# Patient Record
Sex: FEMALE
[De-identification: names, ages, dates, MRNs, and addresses within clinical notes are randomized per-mention and may not be internally consistent; named-entity substitution may affect disease eponyms.]

---

## 2017-04-14 ENCOUNTER — HOSPITAL (OUTPATIENT)
Dept: OTHER | Age: 67
End: 2017-04-14
Attending: INTERNAL MEDICINE

## 2017-04-14 LAB
ALT SERPL-CCNC: 21 UNIT/L
ANION GAP SERPL CALC-SCNC: 13 MMOL/L (ref 10–20)
BUN SERPL-MCNC: 12 MG/DL (ref 6–20)
BUN/CREAT SERPL: 18 (ref 7–25)
CALCIUM SERPL-MCNC: 8.7 MG/DL (ref 8.4–10.2)
CHLORIDE: 104 MMOL/L (ref 98–107)
CO2 SERPL-SCNC: 28 MMOL/L (ref 21–32)
CREAT SERPL-MCNC: 0.66 MG/DL (ref 0.51–0.95)
FREE T4: 1.2 NG/DL (ref 0.8–1.5)
GLUCOSE SERPL-MCNC: 123 MG/DL (ref 65–99)
GLYCOHEMOGLOBIN: 6.3 % (ref 4.5–5.6)
LENGTH OF FAST TIME PATIENT: 12 HR
POTASSIUM SERPL-SCNC: 4.6 MMOL/L (ref 3.4–5.1)
SODIUM SERPL-SCNC: 140 MMOL/L (ref 135–145)
TSH SERPL-ACNC: 4.61 MCUNIT/ML (ref 0.35–5)

## 2017-10-12 ENCOUNTER — HOSPITAL (OUTPATIENT)
Dept: OTHER | Age: 67
End: 2017-10-12
Attending: INTERNAL MEDICINE

## 2017-10-12 LAB
ALT SERPL-CCNC: 22 UNIT/L
ANION GAP SERPL CALC-SCNC: 13 MMOL/L (ref 10–20)
BUN SERPL-MCNC: 10 MG/DL (ref 6–20)
BUN/CREAT SERPL: 14 (ref 7–25)
CALCIUM SERPL-MCNC: 9.2 MG/DL (ref 8.4–10.2)
CHLORIDE: 105 MMOL/L (ref 98–107)
CO2 SERPL-SCNC: 26 MMOL/L (ref 21–32)
CREAT SERPL-MCNC: 0.69 MG/DL (ref 0.51–0.95)
GLUCOSE SERPL-MCNC: 117 MG/DL (ref 65–99)
GLYCOHEMOGLOBIN: 5.8 % (ref 4.5–5.6)
LENGTH OF FAST TIME PATIENT: 12 HR
POTASSIUM SERPL-SCNC: 5.3 MMOL/L (ref 3.4–5.1)
SODIUM SERPL-SCNC: 139 MMOL/L (ref 135–145)
VIT B12 SERPL-MCNC: 248 PG/ML (ref 211–911)

## 2018-02-24 ENCOUNTER — TELEPHONE (OUTPATIENT)
Dept: INTERNAL MEDICINE CLINIC | Facility: CLINIC | Age: 68
End: 2018-02-24

## 2018-06-14 ENCOUNTER — HOSPITAL (OUTPATIENT)
Dept: OTHER | Age: 68
End: 2018-06-14
Attending: INTERNAL MEDICINE

## 2018-06-14 LAB
ALT SERPL-CCNC: 24 UNIT/L
ANION GAP SERPL CALC-SCNC: 15 MMOL/L (ref 10–20)
BUN SERPL-MCNC: 9 MG/DL (ref 6–20)
BUN/CREAT SERPL: 13 (ref 7–25)
CALCIUM SERPL-MCNC: 9.1 MG/DL (ref 8.4–10.2)
CHLORIDE: 103 MMOL/L (ref 98–107)
CO2 SERPL-SCNC: 24 MMOL/L (ref 21–32)
CREAT SERPL-MCNC: 0.68 MG/DL (ref 0.51–0.95)
GLUCOSE SERPL-MCNC: 112 MG/DL (ref 65–99)
GLYCOHEMOGLOBIN: 5.8 % (ref 4.5–5.6)
LENGTH OF FAST TIME PATIENT: 12 HR
POTASSIUM SERPL-SCNC: 4.5 MMOL/L (ref 3.4–5.1)
SODIUM SERPL-SCNC: 137 MMOL/L (ref 135–145)

## 2018-12-01 ENCOUNTER — HOSPITAL (OUTPATIENT)
Dept: OTHER | Age: 68
End: 2018-12-01
Attending: INTERNAL MEDICINE

## 2018-12-01 LAB
ALT SERPL-CCNC: 24 UNIT/L
ALT SERPL-CCNC: 24 UNITS/L
ANION GAP SERPL CALC-SCNC: 14 MMOL/L (ref 10–20)
ANION GAP SERPL CALC-SCNC: 14 MMOL/L (ref 10–20)
BUN SERPL-MCNC: 13 MG/DL (ref 6–20)
BUN SERPL-MCNC: 13 MG/DL (ref 6–20)
BUN/CREAT SERPL: 20 (ref 7–25)
BUN/CREAT SERPL: 20 (ref 7–25)
CALCIUM SERPL-MCNC: 8.8 MG/DL (ref 8.4–10.2)
CALCIUM SERPL-MCNC: 8.8 MG/DL (ref 8.4–10.2)
CHLORIDE SERPL-SCNC: 105 MMOL/L (ref 98–107)
CHLORIDE: 105 MMOL/L (ref 98–107)
CHOLEST SERPL-MCNC: 151 MG/DL
CHOLEST SERPL-MCNC: 151 MG/DL
CHOLEST SERPL-MCNC: NORMAL MG/DL
CHOLEST/HDLC SERPL: 2.5 {RATIO}
CHOLEST/HDLC SERPL: 2.5 {RATIO}
CO2 SERPL-SCNC: 25 MMOL/L (ref 21–32)
CO2 SERPL-SCNC: 25 MMOL/L (ref 21–32)
CREAT SERPL-MCNC: 0.66 MG/DL (ref 0.51–0.95)
CREAT SERPL-MCNC: 0.66 MG/DL (ref 0.51–0.95)
FREE T4: 1.3 NG/DL (ref 0.8–1.5)
GLUCOSE SERPL-MCNC: 120 MG/DL (ref 65–99)
GLUCOSE SERPL-MCNC: 120 MG/DL (ref 65–99)
GLYCOHEMOGLOBIN: 6.3 % (ref 4.5–5.6)
HDLC SERPL-MCNC: 61 MG/DL
HDLC SERPL-MCNC: 61 MG/DL
HDLC SERPL-MCNC: NORMAL MG/DL
LDLC SERPL CALC-MCNC: 73 MG/DL
LDLC SERPL CALC-MCNC: 73 MG/DL
LDLC SERPL CALC-MCNC: NORMAL MG/DL
LENGTH OF FAST TIME PATIENT: 14 HR
LENGTH OF FAST TIME PATIENT: 14 HR
LENGTH OF FAST TIME PATIENT: 14 HRS
LENGTH OF FAST TIME PATIENT: 14 HRS
NONHDLC SERPL-MCNC: 90 MG/DL
NONHDLC SERPL-MCNC: 90 MG/DL
NONHDLC SERPL-MCNC: NORMAL MG/DL
POTASSIUM SERPL-SCNC: 4.3 MMOL/L (ref 3.4–5.1)
POTASSIUM SERPL-SCNC: 4.3 MMOL/L (ref 3.4–5.1)
SODIUM SERPL-SCNC: 140 MMOL/L (ref 135–145)
SODIUM SERPL-SCNC: 140 MMOL/L (ref 135–145)
T4 FREE SERPL-MCNC: 1.3 NG/DL (ref 0.8–1.5)
TRIGL SERPL-MCNC: 83 MG/DL
TRIGL SERPL-MCNC: NORMAL MG/DL
TRIGLYCERIDE (TRIGP): 83 MG/DL
TSH SERPL-ACNC: 2.7 MCUNIT/ML (ref 0.35–5)
TSH SERPL-ACNC: 2.7 MCUNITS/ML (ref 0.35–5)

## 2018-12-02 LAB
HBA1C MFR BLD: 10.0           24 %
HBA1C MFR BLD: 6.0            126 %
HBA1C MFR BLD: 6.3 % (ref 4.5–5.6)
HBA1C MFR BLD: 6.5            14 %
HBA1C MFR BLD: 7.0            154 %
HBA1C MFR BLD: 7.5            169 %
HBA1C MFR BLD: 8.0            183 %
HBA1C MFR BLD: 8.5            197 %
HBA1C MFR BLD: 9.0            212 %
HBA1C MFR BLD: 9.5            226 %
HBA1C MFR BLD: ABNORMAL %

## 2018-12-03 ENCOUNTER — HOSPITAL ENCOUNTER (OUTPATIENT)
Dept: ULTRASOUND IMAGING | Facility: HOSPITAL | Age: 68
Discharge: HOME | End: 2018-12-03
Attending: INTERNAL MEDICINE
Payer: MEDICARE

## 2018-12-03 DIAGNOSIS — E04.1 NON-TOXIC UNINODULAR GOITER: ICD-10-CM

## 2018-12-03 PROCEDURE — 76536 US EXAM OF HEAD AND NECK: CPT | Performed by: INTERNAL MEDICINE

## 2019-03-18 ENCOUNTER — TELEPHONE (OUTPATIENT)
Dept: INTERNAL MEDICINE CLINIC | Facility: CLINIC | Age: 69
End: 2019-03-18

## 2019-03-18 NOTE — TELEPHONE ENCOUNTER
Patient calling for pre surgical lab orders to be entered if possible this week to complete for pre op appt set up next week 4/25/19 with doctor.   She is having cataract surgery with Dr Estiven Avery on 4/17/19 please call her back so she knows when to get labs d

## 2019-03-25 ENCOUNTER — OFFICE VISIT (OUTPATIENT)
Dept: INTERNAL MEDICINE CLINIC | Facility: CLINIC | Age: 69
End: 2019-03-25
Payer: MEDICARE

## 2019-03-25 ENCOUNTER — TELEPHONE (OUTPATIENT)
Dept: INTERNAL MEDICINE CLINIC | Facility: CLINIC | Age: 69
End: 2019-03-25

## 2019-03-25 VITALS
RESPIRATION RATE: 12 BRPM | WEIGHT: 183.5 LBS | SYSTOLIC BLOOD PRESSURE: 112 MMHG | HEIGHT: 64.5 IN | BODY MASS INDEX: 30.95 KG/M2 | TEMPERATURE: 98 F | OXYGEN SATURATION: 99 % | HEART RATE: 67 BPM | DIASTOLIC BLOOD PRESSURE: 76 MMHG

## 2019-03-25 DIAGNOSIS — E11.9 TYPE 2 DIABETES MELLITUS WITHOUT COMPLICATION, WITH LONG-TERM CURRENT USE OF INSULIN (HCC): ICD-10-CM

## 2019-03-25 DIAGNOSIS — Z12.31 ENCOUNTER FOR SCREENING MAMMOGRAM FOR MALIGNANT NEOPLASM OF BREAST: ICD-10-CM

## 2019-03-25 DIAGNOSIS — E78.00 PURE HYPERCHOLESTEROLEMIA: ICD-10-CM

## 2019-03-25 DIAGNOSIS — Z01.818 PRE-OPERATIVE EXAMINATION FOR INTERNAL MEDICINE: Primary | ICD-10-CM

## 2019-03-25 DIAGNOSIS — Z79.4 TYPE 2 DIABETES MELLITUS WITHOUT COMPLICATION, WITH LONG-TERM CURRENT USE OF INSULIN (HCC): ICD-10-CM

## 2019-03-25 DIAGNOSIS — Z78.0 ASYMPTOMATIC MENOPAUSAL STATE: ICD-10-CM

## 2019-03-25 DIAGNOSIS — Z00.00 LABORATORY EXAMINATION ORDERED AS PART OF A ROUTINE GENERAL MEDICAL EXAMINATION: ICD-10-CM

## 2019-03-25 PROCEDURE — 93000 ELECTROCARDIOGRAM COMPLETE: CPT | Performed by: INTERNAL MEDICINE

## 2019-03-25 PROCEDURE — 99204 OFFICE O/P NEW MOD 45 MIN: CPT | Performed by: INTERNAL MEDICINE

## 2019-03-25 NOTE — TELEPHONE ENCOUNTER
Dr Krishna Salcedo called to let us know they faxed 15 pg notes regarding this patient/surgery cataracts etc. Samra Paz

## 2019-03-25 NOTE — PROGRESS NOTES
Elijah Hutchinson  9/10/1950 is a 76year old female.     Patient presents with:  Pre-Op Exam: Cataract surgery right eye with Dr Margy Bustillo @ \[facility]\"" on 2019      HPI:    see below     Current Outpatient Medications:  Netarsudil patient is legally blind secondary to glaucoma does go to [facility] ophthalmology no redness, no drainage. Ears no earaches, no fullness, normal hearing, no tinnitus. Nose and Sinuses no colds, no discharge, no hay fever, no sinus trouble.  Mouth and Phary Ht 64.5\"   Wt 183 lb 8 oz   SpO2 99%   BMI 31.01 kg/m²   GENERAL:   Build: normal .   General Appearance: alert and oriented, pleasant. HEENT:   Ear canals: normal.   EOM: within normal limit-conjunctiva normal.   Head: normocephalic.    Nasal septum: m PLATELET  -     URINALYSIS, ROUTINE  -     [facility] SCREENING BILAT (CPT=77067); Future  -     XR DEXA BONE DENSITOMETRY (CPT=77080); Future    Encounter for screening mammogram for malignant neoplasm of breast   -     [facility] SCREENING BILAT (CPT=77067);  Future

## 2019-03-25 NOTE — PROGRESS NOTES
HPI:    Patient ID: Chey Steel is a 76year old female. HPI    Review of Systems         Current Outpatient Medications:  Netarsudil Dimesylate (RHOPRESSA) 0.02 % Ophthalmic Solution Place 1 drop into the left eye nightly.  Disp:  Rfl:    micah Referrals:  ELECTROCARDIOGRAM, COMPLETE  [facility] SCREENING BILAT (CPT=77067)  XR DEXA BONE DENSITOMETRY (CPT=77080)       TL#[id number]

## 2019-03-25 NOTE — PATIENT INSTRUCTIONS
Patient to see me after all tests are done  Await lab work prior to clearance.   We will send colonoscopy and I records

## 2019-03-26 ENCOUNTER — TELEPHONE (OUTPATIENT)
Dept: INTERNAL MEDICINE CLINIC | Facility: CLINIC | Age: 69
End: 2019-03-26

## 2019-03-26 NOTE — TELEPHONE ENCOUNTER
Exam reports received from [facility] - reports placed in provider in box for review.

## 2019-03-29 ENCOUNTER — APPOINTMENT (OUTPATIENT)
Dept: LAB | Age: 69
End: 2019-03-29
Attending: INTERNAL MEDICINE
Payer: MEDICARE

## 2019-03-29 LAB
ALBUMIN SERPL-MCNC: 3.4 G/DL (ref 3.4–5)
ALBUMIN/GLOB SERPL: 1 {RATIO} (ref 1–2)
ALP LIVER SERPL-CCNC: 72 U/L (ref 55–142)
ALT SERPL-CCNC: 20 U/L (ref 13–56)
ANION GAP SERPL CALC-SCNC: 7 MMOL/L (ref 0–18)
AST SERPL-CCNC: 19 U/L (ref 15–37)
BASOPHILS # BLD AUTO: 0.05 X10(3) UL (ref 0–0.2)
BASOPHILS NFR BLD AUTO: 0.9 %
BILIRUB SERPL-MCNC: 0.5 MG/DL (ref 0.1–2)
BILIRUB UR QL STRIP.AUTO: NEGATIVE
BUN BLD-MCNC: 12 MG/DL (ref 7–18)
BUN/CREAT SERPL: 15.8 (ref 10–20)
CALCIUM BLD-MCNC: 9.1 MG/DL (ref 8.5–10.1)
CHLORIDE SERPL-SCNC: 107 MMOL/L (ref 98–107)
CHOLEST SMN-MCNC: 172 MG/DL (ref ?–200)
CLARITY UR REFRACT.AUTO: CLEAR
CO2 SERPL-SCNC: 25 MMOL/L (ref 21–32)
COLOR UR AUTO: YELLOW
CREAT BLD-MCNC: 0.76 MG/DL (ref 0.55–1.02)
CREAT UR-SCNC: 66.3 MG/DL
DEPRECATED RDW RBC AUTO: 48.9 FL (ref 35.1–46.3)
EOSINOPHIL # BLD AUTO: 0.09 X10(3) UL (ref 0–0.7)
EOSINOPHIL NFR BLD AUTO: 1.7 %
ERYTHROCYTE [DISTWIDTH] IN BLOOD BY AUTOMATED COUNT: 15.2 % (ref 11–15)
EST. AVERAGE GLUCOSE BLD GHB EST-MCNC: 128 MG/DL (ref 68–126)
GLOBULIN PLAS-MCNC: 3.3 G/DL (ref 2.8–4.4)
GLUCOSE BLD-MCNC: 111 MG/DL (ref 70–99)
GLUCOSE UR STRIP.AUTO-MCNC: NEGATIVE MG/DL
HBA1C MFR BLD HPLC: 6.1 % (ref ?–5.7)
HCT VFR BLD AUTO: 41.6 % (ref 35–48)
HDLC SERPL-MCNC: 65 MG/DL (ref 40–59)
HGB BLD-MCNC: 13.4 G/DL (ref 12–16)
IMM GRANULOCYTES # BLD AUTO: 0.01 X10(3) UL (ref 0–1)
IMM GRANULOCYTES NFR BLD: 0.2 %
KETONES UR STRIP.AUTO-MCNC: NEGATIVE MG/DL
LDLC SERPL CALC-MCNC: 88 MG/DL (ref ?–100)
LEUKOCYTE ESTERASE UR QL STRIP.AUTO: NEGATIVE
LYMPHOCYTES # BLD AUTO: 1.6 X10(3) UL (ref 1–4)
LYMPHOCYTES NFR BLD AUTO: 30.4 %
M PROTEIN MFR SERPL ELPH: 6.7 G/DL (ref 6.4–8.2)
MCH RBC QN AUTO: 28.3 PG (ref 26–34)
MCHC RBC AUTO-ENTMCNC: 32.2 G/DL (ref 31–37)
MCV RBC AUTO: 87.8 FL (ref 80–100)
MICROALBUMIN UR-MCNC: 0.51 MG/DL
MICROALBUMIN/CREAT 24H UR-RTO: 7.7 UG/MG (ref ?–30)
MONOCYTES # BLD AUTO: 0.38 X10(3) UL (ref 0.1–1)
MONOCYTES NFR BLD AUTO: 7.2 %
NEUTROPHILS # BLD AUTO: 3.14 X10 (3) UL (ref 1.5–7.7)
NEUTROPHILS # BLD AUTO: 3.14 X10(3) UL (ref 1.5–7.7)
NEUTROPHILS NFR BLD AUTO: 59.6 %
NITRITE UR QL STRIP.AUTO: NEGATIVE
NONHDLC SERPL-MCNC: 107 MG/DL (ref ?–130)
OSMOLALITY SERPL CALC.SUM OF ELEC: 288 MOSM/KG (ref 275–295)
PH UR STRIP.AUTO: 8 [PH] (ref 4.5–8)
PLATELET # BLD AUTO: 229 10(3)UL (ref 150–450)
POTASSIUM SERPL-SCNC: 5.1 MMOL/L (ref 3.5–5.1)
PROT UR STRIP.AUTO-MCNC: NEGATIVE MG/DL
RBC # BLD AUTO: 4.74 X10(6)UL (ref 3.8–5.3)
SODIUM SERPL-SCNC: 139 MMOL/L (ref 136–145)
SP GR UR STRIP.AUTO: 1.01 (ref 1–1.03)
T4 SERPL-MCNC: 11.6 UG/DL (ref 4.8–13.9)
TRIGL SERPL-MCNC: 94 MG/DL (ref 30–149)
TSI SER-ACNC: 3.99 MIU/ML (ref 0.36–3.74)
UROBILINOGEN UR STRIP.AUTO-MCNC: <2 MG/DL
VLDLC SERPL CALC-MCNC: 19 MG/DL (ref 0–30)
WBC # BLD AUTO: 5.3 X10(3) UL (ref 4–11)

## 2019-04-01 ENCOUNTER — TELEPHONE (OUTPATIENT)
Dept: INTERNAL MEDICINE CLINIC | Facility: CLINIC | Age: 69
End: 2019-04-01

## 2019-04-08 ENCOUNTER — OFFICE VISIT (OUTPATIENT)
Dept: INTERNAL MEDICINE CLINIC | Facility: CLINIC | Age: 69
End: 2019-04-08
Payer: MEDICARE

## 2019-04-08 VITALS
DIASTOLIC BLOOD PRESSURE: 72 MMHG | BODY MASS INDEX: 30.99 KG/M2 | HEART RATE: 78 BPM | TEMPERATURE: 98 F | HEIGHT: 64.5 IN | OXYGEN SATURATION: 98 % | SYSTOLIC BLOOD PRESSURE: 122 MMHG | WEIGHT: 183.75 LBS | RESPIRATION RATE: 12 BRPM

## 2019-04-08 DIAGNOSIS — Z79.4 TYPE 2 DIABETES MELLITUS WITHOUT COMPLICATION, WITH LONG-TERM CURRENT USE OF INSULIN (HCC): ICD-10-CM

## 2019-04-08 DIAGNOSIS — E11.9 TYPE 2 DIABETES MELLITUS WITHOUT COMPLICATION, WITH LONG-TERM CURRENT USE OF INSULIN (HCC): ICD-10-CM

## 2019-04-08 DIAGNOSIS — Z00.00 ROUTINE GENERAL MEDICAL EXAMINATION AT A HEALTH CARE FACILITY: Primary | ICD-10-CM

## 2019-04-08 PROCEDURE — G0439 PPPS, SUBSEQ VISIT: HCPCS | Performed by: INTERNAL MEDICINE

## 2019-04-08 NOTE — PROGRESS NOTES
Martínez Rosas  9/10/1950 is a 76year old female. Patient presents with:  Physical      HPI:   cpx     Current Outpatient Medications:  Netarsudil Dimesylate (RHOPRESSA) 0.02 % Ophthalmic Solution Place 1 drop into the left eye nightly.  Disp:  Rfl drainage. Ears no earaches, no fullness, normal hearing, no tinnitus. Nose and Sinuses no colds, no discharge, no hay fever, no sinus trouble. Mouth and Pharynx no sore throats, no hoarseness. Neck no lumps, no goiter, no neck stiffness or pain.    Endocrin normal .   General Appearance: alert and oriented, pleasant. HEENT:   Ear canals: normal.   EOM: within normal limit-conjunctiva normal.   Head: normocephalic. Nasal septum: midline. Nose: unremarkable.    Oral cavity: normal.   Pupils: normal, bilate patient  Patient Instructions   Continue present management       The patient indicates understanding of these issues and agrees to the plan. The patient is asked to Return in about 6 months (around 10/8/2019).   Becky Jones MD

## 2019-04-11 ENCOUNTER — HOSPITAL ENCOUNTER (OUTPATIENT)
Dept: MAMMOGRAPHY | Age: 69
Discharge: HOME | End: 2019-04-11
Attending: INTERNAL MEDICINE
Payer: MEDICARE

## 2019-04-11 ENCOUNTER — HOSPITAL ENCOUNTER (OUTPATIENT)
Dept: BONE DENSITY | Age: 69
Discharge: HOME | End: 2019-04-11
Attending: INTERNAL MEDICINE
Payer: MEDICARE

## 2019-04-11 ENCOUNTER — TELEPHONE (OUTPATIENT)
Dept: INTERNAL MEDICINE CLINIC | Facility: CLINIC | Age: 69
End: 2019-04-11

## 2019-04-11 DIAGNOSIS — Z12.31 ENCOUNTER FOR SCREENING MAMMOGRAM FOR MALIGNANT NEOPLASM OF BREAST: ICD-10-CM

## 2019-04-11 DIAGNOSIS — Z00.00 LABORATORY EXAMINATION ORDERED AS PART OF A ROUTINE GENERAL MEDICAL EXAMINATION: ICD-10-CM

## 2019-04-11 DIAGNOSIS — Z78.0 ASYMPTOMATIC MENOPAUSAL STATE: ICD-10-CM

## 2019-04-11 PROCEDURE — 77067 SCR MAMMO BI INCL CAD: CPT | Performed by: INTERNAL MEDICINE

## 2019-04-11 PROCEDURE — 77063 BREAST TOMOSYNTHESIS BI: CPT | Performed by: INTERNAL MEDICINE

## 2019-04-11 PROCEDURE — 77080 DXA BONE DENSITY AXIAL: CPT | Performed by: INTERNAL MEDICINE

## 2019-04-11 RX ORDER — ALENDRONATE SODIUM 70 MG/1
70 TABLET ORAL WEEKLY
Qty: 12 TABLET | Refills: 0 | Status: SHIPPED | OUTPATIENT
Start: 2019-04-11 | End: 2020-01-07

## 2019-04-11 NOTE — TELEPHONE ENCOUNTER
Notes recorded by Rajesh Monaco MD on 4/11/2019 at 9:28 AM CDT  Reviewed results   Bones are definitely in the osteopenic range they are getting worse compared to before  Would recommend vitamin D 4000 units daily calcium 1500 mg daily and Fosamax once w

## 2019-04-17 ENCOUNTER — PATIENT MESSAGE (OUTPATIENT)
Dept: INTERNAL MEDICINE CLINIC | Facility: CLINIC | Age: 69
End: 2019-04-17

## 2019-04-17 DIAGNOSIS — Z13.21 ENCOUNTER FOR VITAMIN DEFICIENCY SCREENING: Primary | ICD-10-CM

## 2019-04-17 NOTE — TELEPHONE ENCOUNTER
It does not look like a vitamin D has been checked. Please advise if willing to order vitamin D or if would like to follow up with Dr. Natasha Philip when he is back in the office.  TY

## 2019-04-18 NOTE — TELEPHONE ENCOUNTER
Dr. Galileo Munguia- please review pt's mychart message regarding vitamin D level. Vitamin D level pending if you would like pt to have it checked.  TY    From: Za Johnson  Sent: 4/17/2019  3:40 PM CDT  To: Hattie Benavides MD  Subject: [facility]

## 2019-04-18 NOTE — PROGRESS NOTES
Save for Dr. Micah Burden. Johan Zepeda. Milton Mayes MD  Diplomate, American Board of Internal Medicine  [address] [facility]  [address] [facility], [address], [facility], [address]  T: [phone]; F: [address]

## 2019-04-22 RX ORDER — CHOLECALCIFEROL (VITAMIN D3) 50 MCG
1 TABLET ORAL DAILY
Qty: 90 TABLET | Refills: 3 | COMMUNITY
Start: 2019-04-22

## 2019-05-24 ENCOUNTER — LAB ENCOUNTER (OUTPATIENT)
Dept: LAB | Age: 69
End: 2019-05-24
Attending: INTERNAL MEDICINE
Payer: MEDICARE

## 2019-05-24 DIAGNOSIS — Z13.21 ENCOUNTER FOR VITAMIN DEFICIENCY SCREENING: ICD-10-CM

## 2019-05-24 PROCEDURE — 36415 COLL VENOUS BLD VENIPUNCTURE: CPT

## 2019-05-24 PROCEDURE — 82306 VITAMIN D 25 HYDROXY: CPT

## 2020-01-07 ENCOUNTER — OFFICE VISIT (OUTPATIENT)
Dept: INTERNAL MEDICINE CLINIC | Facility: CLINIC | Age: 70
End: 2020-01-07
Payer: MEDICARE

## 2020-01-07 VITALS
DIASTOLIC BLOOD PRESSURE: 84 MMHG | SYSTOLIC BLOOD PRESSURE: 140 MMHG | WEIGHT: 188.25 LBS | RESPIRATION RATE: 16 BRPM | OXYGEN SATURATION: 98 % | HEART RATE: 83 BPM | HEIGHT: 64.5 IN | TEMPERATURE: 99 F | BODY MASS INDEX: 31.75 KG/M2

## 2020-01-07 DIAGNOSIS — Z79.4 TYPE 2 DIABETES MELLITUS WITHOUT COMPLICATION, WITH LONG-TERM CURRENT USE OF INSULIN (HCC): ICD-10-CM

## 2020-01-07 DIAGNOSIS — E78.00 PURE HYPERCHOLESTEROLEMIA: ICD-10-CM

## 2020-01-07 DIAGNOSIS — E11.9 TYPE 2 DIABETES MELLITUS WITHOUT COMPLICATION, WITH LONG-TERM CURRENT USE OF INSULIN (HCC): ICD-10-CM

## 2020-01-07 DIAGNOSIS — Z01.818 PREOPERATIVE EXAMINATION, UNSPECIFIED: Primary | ICD-10-CM

## 2020-01-07 PROCEDURE — 99214 OFFICE O/P EST MOD 30 MIN: CPT | Performed by: INTERNAL MEDICINE

## 2020-01-07 RX ORDER — ALENDRONATE SODIUM 70 MG/1
70 TABLET ORAL WEEKLY
Qty: 12 TABLET | Refills: 0 | Status: SHIPPED | OUTPATIENT
Start: 2020-01-07 | End: 2020-06-15

## 2020-01-07 NOTE — PROGRESS NOTES
James Louie  9/10/1950 is a 71year old female.     Patient presents with:  Pre-Op Exam: Pre-op clearance for Cataract Surgery left eye- Dr. Luz Lawrence sched -      HPI:   He  has had previous anesthesia:  yes  Previous complications:  no  Current Ou health, no fatigue, no fever, no weakness, no weight loss or gain . HEENT/Neck:   Head no headache, no dizziness, no lightheadedness. Eyes does see Dr. Eris Chavez for surgery in the left eye. Ears no complaints. Nose and Sinuses no complaints.  Mout Chest Shape: normal .   Percussion: normal.   Rales: no .   Respiratory effort: normal .   Rhonchi: no.   Wheezes: no. ABDOMEN:   General: normal.   Liver, Spleen: non-enlarged. EXTREMITIES:   Clubbing: none. Cyanosis: absent . Edema: none.    Pul

## 2020-01-09 LAB
ALBUMIN/GLOBULIN RATIO: 1.8 (CALC) (ref 1–2.5)
ALBUMIN: 4.1 G/DL (ref 3.6–5.1)
ALKALINE PHOSPHATASE: 73 U/L (ref 33–130)
ALT: 16 U/L (ref 6–29)
AST: 20 U/L (ref 10–35)
BILIRUBIN, TOTAL: 0.6 MG/DL (ref 0.2–1.2)
BUN: 11 MG/DL (ref 7–25)
CALCIUM: 9.4 MG/DL (ref 8.6–10.4)
CARBON DIOXIDE: 29 MMOL/L (ref 20–32)
CHLORIDE: 102 MMOL/L (ref 98–110)
CHOL/HDLC RATIO: 3 (CALC)
CHOLESTEROL, TOTAL: 170 MG/DL
CREATININE: 0.78 MG/DL (ref 0.5–0.99)
EGFR IF AFRICN AM: 90 ML/MIN/1.73M2
EGFR IF NONAFRICN AM: 78 ML/MIN/1.73M2
GLOBULIN: 2.3 G/DL (CALC) (ref 1.9–3.7)
GLUCOSE: 130 MG/DL (ref 65–99)
HDL CHOLESTEROL: 56 MG/DL
HEMOGLOBIN A1C: 6.1 % OF TOTAL HGB
LDL-CHOLESTEROL: 90 MG/DL (CALC)
NON-HDL CHOLESTEROL: 114 MG/DL (CALC)
POTASSIUM: 5.6 MMOL/L (ref 3.5–5.3)
PROTEIN, TOTAL: 6.4 G/DL (ref 6.1–8.1)
SODIUM: 137 MMOL/L (ref 135–146)
TRIGLYCERIDES: 137 MG/DL

## 2020-01-13 ENCOUNTER — TELEPHONE (OUTPATIENT)
Dept: INTERNAL MEDICINE CLINIC | Facility: CLINIC | Age: 70
End: 2020-01-13

## 2020-01-13 DIAGNOSIS — E87.5 SERUM POTASSIUM ELEVATED: Primary | ICD-10-CM

## 2020-01-13 NOTE — TELEPHONE ENCOUNTER
Notes recorded by Amy Diallo MD on 1/9/2020 at 3:01 PM CST  Reviewed results hemoglobin A1c is excellent lipid panel is also unremarkable however potassium is slightly high at 5.6 could be a lab aberration however would recommend holding the lisinopri

## 2020-01-14 NOTE — TELEPHONE ENCOUNTER
Patient calling in asking to speak with the Nurse regarding the conversation from yesterday. Pt stated she has a few more questions.      T: [phone]

## 2020-01-14 NOTE — TELEPHONE ENCOUNTER
Pt calling to verify that clearance will be able to be sent on Monday if she has lab completed on Thursday/Friday. Advised pt that provider most likely wouldn't get lab until Monday unless done on Thursday. Pt stated she would have lab completed Thursday.

## 2020-01-16 LAB
BUN: 9 MG/DL (ref 7–25)
CALCIUM: 9.3 MG/DL (ref 8.6–10.4)
CARBON DIOXIDE: 26 MMOL/L (ref 20–32)
CHLORIDE: 100 MMOL/L (ref 98–110)
CREATININE: 0.84 MG/DL (ref 0.5–0.99)
EGFR IF AFRICN AM: 82 ML/MIN/1.73M2
EGFR IF NONAFRICN AM: 71 ML/MIN/1.73M2
GLUCOSE: 116 MG/DL (ref 65–99)
POTASSIUM: 5.1 MMOL/L (ref 3.5–5.3)
SODIUM: 135 MMOL/L (ref 135–146)

## 2020-01-17 ENCOUNTER — TELEPHONE (OUTPATIENT)
Dept: INTERNAL MEDICINE CLINIC | Facility: CLINIC | Age: 70
End: 2020-01-17

## 2020-06-15 RX ORDER — ALENDRONATE SODIUM 70 MG/1
TABLET ORAL
Qty: 12 TABLET | Refills: 0 | Status: SHIPPED | OUTPATIENT
Start: 2020-06-15 | End: 2020-09-10

## 2020-06-15 NOTE — TELEPHONE ENCOUNTER
Passed protocol - DEXA: 4/11/2019    Requesting alendronate 70 MG Oral Tab  LOV: 1/7/20  RTC: 6 months  Last Relevant Labs: 1/16/20  Filled: 1/7/20 #12 with 0 refills    No future appointments.

## 2020-06-16 RX ORDER — ALENDRONATE SODIUM 70 MG/1
TABLET ORAL
Qty: 13 TABLET | Refills: 0 | OUTPATIENT
Start: 2020-06-16

## 2020-08-07 ENCOUNTER — TELEPHONE (OUTPATIENT)
Dept: INTERNAL MEDICINE CLINIC | Facility: CLINIC | Age: 70
End: 2020-08-07

## 2020-08-07 DIAGNOSIS — E78.00 PURE HYPERCHOLESTEROLEMIA: ICD-10-CM

## 2020-08-07 DIAGNOSIS — E11.9 TYPE 2 DIABETES MELLITUS WITHOUT COMPLICATION, WITH LONG-TERM CURRENT USE OF INSULIN (HCC): ICD-10-CM

## 2020-08-07 DIAGNOSIS — Z13.29 SCREENING FOR THYROID DISORDER: ICD-10-CM

## 2020-08-07 DIAGNOSIS — Z00.00 LABORATORY EXAMINATION ORDERED AS PART OF A ROUTINE GENERAL MEDICAL EXAMINATION: Primary | ICD-10-CM

## 2020-08-07 DIAGNOSIS — Z79.4 TYPE 2 DIABETES MELLITUS WITHOUT COMPLICATION, WITH LONG-TERM CURRENT USE OF INSULIN (HCC): ICD-10-CM

## 2020-08-07 NOTE — TELEPHONE ENCOUNTER
Patient scheduled an appointment for a follow up on 08/21/2020, however, pt is requesting for the (repeat) labs to be entered prior to her visit. Please call pt with order status.

## 2020-08-10 NOTE — TELEPHONE ENCOUNTER
Spoke with patient and advised her that her lab orders have been placed. Rescheduled patient's appointment to 09/10 at 12:00pm for her Map Supervisit. Patient verbalized understanding.

## 2020-08-10 NOTE — TELEPHONE ENCOUNTER
Front staff: Pt due for Horace. Is she willing to reschedule to a cpx time slot? Dr. Marty Carrillo: Annual lab orders pending as pt is due. Please advise.  TY

## 2020-09-08 ENCOUNTER — TELEPHONE (OUTPATIENT)
Dept: INTERNAL MEDICINE CLINIC | Facility: CLINIC | Age: 70
End: 2020-09-08

## 2020-09-08 DIAGNOSIS — Z00.00 LABORATORY EXAMINATION ORDERED AS PART OF A ROUTINE GENERAL MEDICAL EXAMINATION: Primary | ICD-10-CM

## 2020-09-08 DIAGNOSIS — Z79.4 TYPE 2 DIABETES MELLITUS WITHOUT COMPLICATION, WITH LONG-TERM CURRENT USE OF INSULIN (HCC): ICD-10-CM

## 2020-09-08 DIAGNOSIS — E11.9 TYPE 2 DIABETES MELLITUS WITHOUT COMPLICATION, WITH LONG-TERM CURRENT USE OF INSULIN (HCC): ICD-10-CM

## 2020-09-08 NOTE — TELEPHONE ENCOUNTER
Pt stated no urine tests were completed but all blood tests were completed. Orders placed through [facility] for urine testing. Pt aware to schedule lab appt.

## 2020-09-08 NOTE — TELEPHONE ENCOUNTER
Patient calling for change in lab location to [facility] for Urinalysis order her insurance did not cover at [facility]; she also would like to know if she needs to fast with this test; advised nurse can confirm when calling her that order is placed.

## 2020-09-09 ENCOUNTER — LAB ENCOUNTER (OUTPATIENT)
Dept: LAB | Age: 70
End: 2020-09-09
Attending: INTERNAL MEDICINE
Payer: MEDICARE

## 2020-09-09 DIAGNOSIS — E11.9 TYPE 2 DIABETES MELLITUS WITHOUT COMPLICATION, WITH LONG-TERM CURRENT USE OF INSULIN (HCC): ICD-10-CM

## 2020-09-09 DIAGNOSIS — Z00.00 LABORATORY EXAMINATION ORDERED AS PART OF A ROUTINE GENERAL MEDICAL EXAMINATION: ICD-10-CM

## 2020-09-09 DIAGNOSIS — Z79.4 TYPE 2 DIABETES MELLITUS WITHOUT COMPLICATION, WITH LONG-TERM CURRENT USE OF INSULIN (HCC): ICD-10-CM

## 2020-09-09 LAB
ABSOLUTE BASOPHILS: 47 CELLS/UL (ref 0–200)
ABSOLUTE EOSINOPHILS: 161 CELLS/UL (ref 15–500)
ABSOLUTE LYMPHOCYTES: 2271 CELLS/UL (ref 850–3900)
ABSOLUTE MONOCYTES: 630 CELLS/UL (ref 200–950)
ABSOLUTE NEUTROPHILS: 3591 CELLS/UL (ref 1500–7800)
ALBUMIN/GLOBULIN RATIO: 1.6 (CALC) (ref 1–2.5)
ALBUMIN: 4.1 G/DL (ref 3.6–5.1)
ALKALINE PHOSPHATASE: 63 U/L (ref 37–153)
ALT: 15 U/L (ref 6–29)
AST: 21 U/L (ref 10–35)
BASOPHILS: 0.7 %
BILIRUB UR QL STRIP.AUTO: NEGATIVE
BILIRUBIN, TOTAL: 0.5 MG/DL (ref 0.2–1.2)
BUN: 9 MG/DL (ref 7–25)
CALCIUM: 9.5 MG/DL (ref 8.6–10.4)
CARBON DIOXIDE: 27 MMOL/L (ref 20–32)
CHLORIDE: 103 MMOL/L (ref 98–110)
CHOL/HDLC RATIO: 2.9 (CALC)
CHOLESTEROL, TOTAL: 161 MG/DL
CLARITY UR REFRACT.AUTO: CLEAR
COLOR UR AUTO: YELLOW
CREAT UR-SCNC: 76.6 MG/DL
CREATININE: 0.82 MG/DL (ref 0.5–0.99)
EGFR IF AFRICN AM: 85 ML/MIN/1.73M2
EGFR IF NONAFRICN AM: 73 ML/MIN/1.73M2
EOSINOPHILS: 2.4 %
GLOBULIN: 2.5 G/DL (CALC) (ref 1.9–3.7)
GLUCOSE UR STRIP.AUTO-MCNC: NEGATIVE MG/DL
GLUCOSE: 123 MG/DL (ref 65–99)
HDL CHOLESTEROL: 55 MG/DL
HEMATOCRIT: 41.4 % (ref 35–45)
HEMOGLOBIN A1C: 5.7 % OF TOTAL HGB
HEMOGLOBIN: 13.5 G/DL (ref 11.7–15.5)
KETONES UR STRIP.AUTO-MCNC: NEGATIVE MG/DL
LDL-CHOLESTEROL: 81 MG/DL (CALC)
LEUKOCYTE ESTERASE UR QL STRIP.AUTO: NEGATIVE
LYMPHOCYTES: 33.9 %
MCH: 27.3 PG (ref 27–33)
MCHC: 32.6 G/DL (ref 32–36)
MCV: 83.8 FL (ref 80–100)
MICROALBUMIN UR-MCNC: 0.83 MG/DL
MICROALBUMIN/CREAT 24H UR-RTO: 10.8 UG/MG (ref ?–30)
MONOCYTES: 9.4 %
MPV: 11 FL (ref 7.5–12.5)
NEUTROPHILS: 53.6 %
NITRITE UR QL STRIP.AUTO: NEGATIVE
NON-HDL CHOLESTEROL: 106 MG/DL (CALC)
PH UR STRIP.AUTO: 8 [PH] (ref 4.5–8)
PLATELET COUNT: 300 THOUSAND/UL (ref 140–400)
POTASSIUM: 4.6 MMOL/L (ref 3.5–5.3)
PROT UR STRIP.AUTO-MCNC: NEGATIVE MG/DL
PROTEIN, TOTAL: 6.6 G/DL (ref 6.1–8.1)
RDW: 13.4 % (ref 11–15)
RED BLOOD CELL COUNT: 4.94 MILLION/UL (ref 3.8–5.1)
SODIUM: 136 MMOL/L (ref 135–146)
SP GR UR STRIP.AUTO: 1.01 (ref 1–1.03)
T4 (THYROXINE), TOTAL: 14.5 MCG/DL (ref 5.1–11.9)
TRIGLYCERIDES: 145 MG/DL
TSH: 4.14 MIU/L (ref 0.4–4.5)
UROBILINOGEN UR STRIP.AUTO-MCNC: <2 MG/DL
WHITE BLOOD CELL COUNT: 6.7 THOUSAND/UL (ref 3.8–10.8)

## 2020-09-09 PROCEDURE — 82570 ASSAY OF URINE CREATININE: CPT

## 2020-09-09 PROCEDURE — 82043 UR ALBUMIN QUANTITATIVE: CPT

## 2020-09-09 PROCEDURE — 81001 URINALYSIS AUTO W/SCOPE: CPT

## 2020-09-10 ENCOUNTER — OFFICE VISIT (OUTPATIENT)
Dept: INTERNAL MEDICINE CLINIC | Facility: CLINIC | Age: 70
End: 2020-09-10
Payer: MEDICARE

## 2020-09-10 VITALS
DIASTOLIC BLOOD PRESSURE: 76 MMHG | WEIGHT: 176.81 LBS | SYSTOLIC BLOOD PRESSURE: 124 MMHG | BODY MASS INDEX: 29.82 KG/M2 | TEMPERATURE: 99 F | RESPIRATION RATE: 16 BRPM | HEART RATE: 84 BPM | HEIGHT: 64.5 IN

## 2020-09-10 DIAGNOSIS — E11.9 TYPE 2 DIABETES MELLITUS WITHOUT COMPLICATION, WITH LONG-TERM CURRENT USE OF INSULIN (HCC): ICD-10-CM

## 2020-09-10 DIAGNOSIS — M85.88 OSTEOPENIA OF LUMBAR SPINE: ICD-10-CM

## 2020-09-10 DIAGNOSIS — Z00.00 ROUTINE GENERAL MEDICAL EXAMINATION AT A HEALTH CARE FACILITY: Primary | ICD-10-CM

## 2020-09-10 DIAGNOSIS — Z79.4 TYPE 2 DIABETES MELLITUS WITHOUT COMPLICATION, WITH LONG-TERM CURRENT USE OF INSULIN (HCC): ICD-10-CM

## 2020-09-10 DIAGNOSIS — Z12.31 ENCOUNTER FOR SCREENING MAMMOGRAM FOR MALIGNANT NEOPLASM OF BREAST: ICD-10-CM

## 2020-09-10 DIAGNOSIS — Z12.11 SCREENING FOR COLON CANCER: ICD-10-CM

## 2020-09-10 DIAGNOSIS — E78.00 PURE HYPERCHOLESTEROLEMIA: ICD-10-CM

## 2020-09-10 PROCEDURE — G0009 ADMIN PNEUMOCOCCAL VACCINE: HCPCS | Performed by: INTERNAL MEDICINE

## 2020-09-10 PROCEDURE — 93000 ELECTROCARDIOGRAM COMPLETE: CPT | Performed by: INTERNAL MEDICINE

## 2020-09-10 PROCEDURE — G0439 PPPS, SUBSEQ VISIT: HCPCS | Performed by: INTERNAL MEDICINE

## 2020-09-10 PROCEDURE — 90670 PCV13 VACCINE IM: CPT | Performed by: INTERNAL MEDICINE

## 2020-09-10 RX ORDER — ALENDRONATE SODIUM 70 MG/1
70 TABLET ORAL WEEKLY
Qty: 12 TABLET | Refills: 0 | Status: SHIPPED | OUTPATIENT
Start: 2020-09-10 | End: 2020-10-26

## 2020-09-10 NOTE — PROGRESS NOTES
Za Johnson  9/10/1950 is a 79year old female.     Patient presents with:  Wellness Visit      HPI:   cpx   Current Outpatient Medications   Medication Sig Dispense Refill   • alendronate 70 MG Oral Tab Take 1 tablet (70 mg total) by mouth once a w HEENT/Neck:   Head no headache, no dizziness, no lightheadedness. Eyes patient is legally blind secondary to glaucoma does go to -[facility] ophthalmology no redness, no drainage. Ears no earaches, no fullness, normal hearing, no tinnitus.  Nose and Sinus Psychiatric: none            EXAM:   /76 (BP Location: Left arm, Patient Position: Sitting, Cuff Size: adult)   Pulse 84   Temp 98.5 °F (36.9 °C) (Oral)   Resp 16   Ht 64.5\"   Wt 176 lb 12.8 oz (80.2 kg)   BMI 29.88 kg/m²   GENERAL:   Build: lisa for wellness visit. Diagnoses and all orders for this visit:    Routine general medical examination at a health care facility  -     ELECTROCARDIOGRAM, COMPLETE  -     [facility] SCREENING BILAT (CPT=77067);  Future  -     Cancel: GASTRO - INTERNAL    Pure hype

## 2020-09-23 ENCOUNTER — TELEPHONE (OUTPATIENT)
Dept: INTERNAL MEDICINE CLINIC | Facility: CLINIC | Age: 70
End: 2020-09-23

## 2020-09-23 NOTE — TELEPHONE ENCOUNTER
Pt stated she was instructed to see a cardiologist at last [address] on 9/10/2020. Pt stated that her dtr is a cardiologist and she discussed w/ pt dtr. Pt is wanting provider to call pt dtr to discuss.     Pt dtr contact info below:    Reva Mancillafilippo - [phone]

## 2020-09-23 NOTE — TELEPHONE ENCOUNTER
Pt would like MA to call nancy back regarding her cardiologist referral did try to get more information but pt did not want to give anymore information

## 2020-10-25 DIAGNOSIS — M85.88 OSTEOPENIA OF LUMBAR SPINE: ICD-10-CM

## 2020-10-26 RX ORDER — ALENDRONATE SODIUM 70 MG/1
TABLET ORAL
Qty: 12 TABLET | Refills: 3 | Status: SHIPPED | OUTPATIENT
Start: 2020-10-26 | End: 2021-10-28

## 2020-10-26 NOTE — TELEPHONE ENCOUNTER
Passed protocol    Requesting alendronate 70 MG Oral Tab  LOV: 9/10/20  RTC: 6 months  Last Relevant Labs: 9/9/20  Filled: 9/10/20 #12 with 0 refills    No future appointments.

## 2021-01-26 ENCOUNTER — IMMUNIZATION (OUTPATIENT)
Dept: LAB | Facility: HOSPITAL | Age: 71
End: 2021-01-26
Attending: EMERGENCY MEDICINE
Payer: MEDICARE

## 2021-01-26 DIAGNOSIS — Z23 NEED FOR VACCINATION: Primary | ICD-10-CM

## 2021-01-26 PROCEDURE — 0011A SARSCOV2 VAC 100MCG/0.5ML IM: CPT

## 2021-02-23 ENCOUNTER — IMMUNIZATION (OUTPATIENT)
Dept: LAB | Facility: HOSPITAL | Age: 71
End: 2021-02-23
Attending: EMERGENCY MEDICINE
Payer: MEDICARE

## 2021-02-23 DIAGNOSIS — Z23 NEED FOR VACCINATION: Primary | ICD-10-CM

## 2021-02-23 PROCEDURE — 0012A SARSCOV2 VAC 100MCG/0.5ML IM: CPT

## 2021-04-05 ENCOUNTER — TELEPHONE (OUTPATIENT)
Dept: INTERNAL MEDICINE CLINIC | Facility: CLINIC | Age: 71
End: 2021-04-05

## 2021-04-05 DIAGNOSIS — E11.9 TYPE 2 DIABETES MELLITUS WITHOUT COMPLICATION, WITH LONG-TERM CURRENT USE OF INSULIN (HCC): Primary | ICD-10-CM

## 2021-04-05 DIAGNOSIS — Z79.4 TYPE 2 DIABETES MELLITUS WITHOUT COMPLICATION, WITH LONG-TERM CURRENT USE OF INSULIN (HCC): Primary | ICD-10-CM

## 2021-04-05 NOTE — TELEPHONE ENCOUNTER
Patient called stating she received reminder message to complete A1C and to please place order. Order pending if appropriate.

## 2021-04-12 ENCOUNTER — LAB ENCOUNTER (OUTPATIENT)
Dept: LAB | Age: 71
End: 2021-04-12
Attending: INTERNAL MEDICINE
Payer: MEDICARE

## 2021-04-12 DIAGNOSIS — E11.9 TYPE 2 DIABETES MELLITUS WITHOUT COMPLICATION, WITH LONG-TERM CURRENT USE OF INSULIN (HCC): ICD-10-CM

## 2021-04-12 DIAGNOSIS — Z79.4 TYPE 2 DIABETES MELLITUS WITHOUT COMPLICATION, WITH LONG-TERM CURRENT USE OF INSULIN (HCC): ICD-10-CM

## 2021-04-12 PROCEDURE — 36415 COLL VENOUS BLD VENIPUNCTURE: CPT

## 2021-04-12 PROCEDURE — 83036 HEMOGLOBIN GLYCOSYLATED A1C: CPT

## 2021-07-27 ENCOUNTER — HOSPITAL ENCOUNTER (OUTPATIENT)
Dept: MAMMOGRAPHY | Age: 71
Discharge: HOME | End: 2021-07-27
Attending: INTERNAL MEDICINE
Payer: MEDICARE

## 2021-07-27 DIAGNOSIS — Z00.00 ROUTINE GENERAL MEDICAL EXAMINATION AT A HEALTH CARE FACILITY: ICD-10-CM

## 2021-07-27 DIAGNOSIS — Z12.31 ENCOUNTER FOR SCREENING MAMMOGRAM FOR MALIGNANT NEOPLASM OF BREAST: ICD-10-CM

## 2021-07-27 PROCEDURE — 77063 BREAST TOMOSYNTHESIS BI: CPT | Performed by: INTERNAL MEDICINE

## 2021-07-27 PROCEDURE — 77067 SCR MAMMO BI INCL CAD: CPT | Performed by: INTERNAL MEDICINE

## 2021-07-29 DIAGNOSIS — E11.9 TYPE 2 DIABETES MELLITUS WITHOUT COMPLICATION, WITH LONG-TERM CURRENT USE OF INSULIN (HCC): ICD-10-CM

## 2021-07-29 DIAGNOSIS — Z79.4 TYPE 2 DIABETES MELLITUS WITHOUT COMPLICATION, WITH LONG-TERM CURRENT USE OF INSULIN (HCC): ICD-10-CM

## 2021-07-29 NOTE — TELEPHONE ENCOUNTER
Medication(s) to Refill:   Requested Prescriptions     Pending Prescriptions Disp Refills   • METFORMIN HCL 1000 MG Oral Tab [Pharmacy Med Name: MetFORMIN 1000MG TABLET] 180 tablet 3     Sig: TAKE 1 TABLET BY MOUTH  TWICE DAILY WITH MEALS       LOV: 9-10-2

## 2021-10-27 DIAGNOSIS — M85.88 OSTEOPENIA OF LUMBAR SPINE: ICD-10-CM

## 2021-10-27 NOTE — TELEPHONE ENCOUNTER
Medication(s) to Refill:   Requested Prescriptions     Pending Prescriptions Disp Refills   • ALENDRONATE 70 MG Oral Tab [Pharmacy Med Name: Alendronate Sodium 70 MG Oral Tablet] 12 tablet 3     Sig: TAKE 1 TABLET BY MOUTH  WEEKLY WITH 8 OZ OF PLAIN  WATER

## 2021-10-28 RX ORDER — ALENDRONATE SODIUM 70 MG/1
TABLET ORAL
Qty: 12 TABLET | Refills: 3 | Status: SHIPPED | OUTPATIENT
Start: 2021-10-28

## 2021-11-23 ENCOUNTER — OFFICE VISIT (OUTPATIENT)
Dept: INTERNAL MEDICINE CLINIC | Facility: CLINIC | Age: 71
End: 2021-11-23
Payer: MEDICARE

## 2021-11-23 VITALS
HEART RATE: 76 BPM | TEMPERATURE: 98 F | RESPIRATION RATE: 16 BRPM | OXYGEN SATURATION: 99 % | SYSTOLIC BLOOD PRESSURE: 126 MMHG | BODY MASS INDEX: 25.74 KG/M2 | HEIGHT: 64.5 IN | DIASTOLIC BLOOD PRESSURE: 84 MMHG | WEIGHT: 152.63 LBS

## 2021-11-23 DIAGNOSIS — Z79.4 TYPE 2 DIABETES MELLITUS WITHOUT COMPLICATION, WITH LONG-TERM CURRENT USE OF INSULIN (HCC): ICD-10-CM

## 2021-11-23 DIAGNOSIS — E11.9 TYPE 2 DIABETES MELLITUS WITHOUT COMPLICATION, WITH LONG-TERM CURRENT USE OF INSULIN (HCC): ICD-10-CM

## 2021-11-23 DIAGNOSIS — Z79.899 OTHER LONG TERM (CURRENT) DRUG THERAPY: ICD-10-CM

## 2021-11-23 DIAGNOSIS — E78.00 PURE HYPERCHOLESTEROLEMIA: ICD-10-CM

## 2021-11-23 DIAGNOSIS — M85.88 OSTEOPENIA OF LUMBAR SPINE: ICD-10-CM

## 2021-11-23 DIAGNOSIS — Z00.00 ROUTINE GENERAL MEDICAL EXAMINATION AT A HEALTH CARE FACILITY: Primary | ICD-10-CM

## 2021-11-23 PROCEDURE — 93000 ELECTROCARDIOGRAM COMPLETE: CPT | Performed by: INTERNAL MEDICINE

## 2021-11-23 PROCEDURE — G0439 PPPS, SUBSEQ VISIT: HCPCS | Performed by: INTERNAL MEDICINE

## 2021-11-23 NOTE — PROGRESS NOTES
Luis Judd  9/10/1950 is a 70year old female.     Patient presents with:  Wellness Visit: AWV      HPI:   cpx   Current Outpatient Medications   Medication Sig Dispense Refill   • ALENDRONATE 70 MG Oral Tab TAKE 1 TABLET BY MOUTH  WEEKLY WITH 8 OZ health, no fatigue, no fever, no weakness, no weight loss or gain . HEENT/Neck:   Head no headache, no dizziness, no lightheadedness. Eyes patient is legally blind secondary to glaucoma does go to -[facility] ophthalmology no redness, no drainage.  Ears n Tingling/numbness none. Trouble with balance none.    Psychiatric: none            EXAM:   /84   Pulse 76   Temp 97.9 °F (36.6 °C) (Oral)   Resp 16   Ht 5' 4.5\" (1.638 m)   Wt 152 lb 9.6 oz (69.2 kg)   SpO2 99%   BMI 25.79 kg/m²   GENERAL:   Build: n today for wellness visit. Diagnoses and all orders for this visit:    Routine general medical examination at a health care facility  -     CBC WITH DIFFERENTIAL WITH PLATELET; Future  -     COMP METABOLIC PANEL (14);  Future  -     T4(THYROXINE TOTAL); F

## 2022-03-02 ENCOUNTER — HOSPITAL ENCOUNTER (OUTPATIENT)
Dept: BONE DENSITY | Age: 72
Discharge: HOME | End: 2022-03-02
Attending: INTERNAL MEDICINE
Payer: MEDICARE

## 2022-03-02 ENCOUNTER — LAB ENCOUNTER (OUTPATIENT)
Dept: LAB | Age: 72
End: 2022-03-02
Attending: INTERNAL MEDICINE
Payer: MEDICARE

## 2022-03-02 DIAGNOSIS — Z79.899 OTHER LONG TERM (CURRENT) DRUG THERAPY: ICD-10-CM

## 2022-03-02 DIAGNOSIS — E78.00 PURE HYPERCHOLESTEROLEMIA: ICD-10-CM

## 2022-03-02 DIAGNOSIS — Z00.00 ROUTINE GENERAL MEDICAL EXAMINATION AT A HEALTH CARE FACILITY: ICD-10-CM

## 2022-03-02 DIAGNOSIS — M85.88 OSTEOPENIA OF LUMBAR SPINE: ICD-10-CM

## 2022-03-02 DIAGNOSIS — Z79.4 TYPE 2 DIABETES MELLITUS WITHOUT COMPLICATION, WITH LONG-TERM CURRENT USE OF INSULIN (HCC): ICD-10-CM

## 2022-03-02 DIAGNOSIS — E11.9 TYPE 2 DIABETES MELLITUS WITHOUT COMPLICATION, WITH LONG-TERM CURRENT USE OF INSULIN (HCC): ICD-10-CM

## 2022-03-02 LAB
ALBUMIN SERPL-MCNC: 3.3 G/DL (ref 3.4–5)
ALBUMIN/GLOB SERPL: 1 {RATIO} (ref 1–2)
ALP LIVER SERPL-CCNC: 92 U/L
ALT SERPL-CCNC: 18 U/L
ANION GAP SERPL CALC-SCNC: 5 MMOL/L (ref 0–18)
AST SERPL-CCNC: 20 U/L (ref 15–37)
BASOPHILS # BLD AUTO: 0.04 X10(3) UL (ref 0–0.2)
BASOPHILS NFR BLD AUTO: 0.7 %
BILIRUB SERPL-MCNC: 0.6 MG/DL (ref 0.1–2)
BILIRUB UR QL STRIP.AUTO: NEGATIVE
BUN BLD-MCNC: 10 MG/DL (ref 7–18)
CALCIUM BLD-MCNC: 9 MG/DL (ref 8.5–10.1)
CHLORIDE SERPL-SCNC: 102 MMOL/L (ref 98–112)
CHOLEST SERPL-MCNC: 162 MG/DL (ref ?–200)
CO2 SERPL-SCNC: 28 MMOL/L (ref 21–32)
COLOR UR AUTO: YELLOW
CREAT BLD-MCNC: 0.75 MG/DL
CREAT UR-SCNC: 49.9 MG/DL
EOSINOPHIL # BLD AUTO: 0.1 X10(3) UL (ref 0–0.7)
EOSINOPHIL NFR BLD AUTO: 1.8 %
ERYTHROCYTE [DISTWIDTH] IN BLOOD BY AUTOMATED COUNT: 13.9 %
EST. AVERAGE GLUCOSE BLD GHB EST-MCNC: 128 MG/DL (ref 68–126)
FASTING PATIENT LIPID ANSWER: YES
FASTING STATUS PATIENT QL REPORTED: YES
GLOBULIN PLAS-MCNC: 3.2 G/DL (ref 2.8–4.4)
GLUCOSE BLD-MCNC: 133 MG/DL (ref 70–99)
GLUCOSE UR STRIP.AUTO-MCNC: NEGATIVE MG/DL
HBA1C MFR BLD: 6.1 % (ref ?–5.7)
HCT VFR BLD AUTO: 41.3 %
HDLC SERPL-MCNC: 69 MG/DL (ref 40–59)
HGB BLD-MCNC: 13.2 G/DL
HYALINE CASTS #/AREA URNS AUTO: PRESENT /LPF
IMM GRANULOCYTES # BLD AUTO: 0.02 X10(3) UL (ref 0–1)
IMM GRANULOCYTES NFR BLD: 0.4 %
KETONES UR STRIP.AUTO-MCNC: NEGATIVE MG/DL
LDLC SERPL CALC-MCNC: 76 MG/DL (ref ?–100)
LEUKOCYTE ESTERASE UR QL STRIP.AUTO: NEGATIVE
LYMPHOCYTES # BLD AUTO: 1.74 X10(3) UL (ref 1–4)
LYMPHOCYTES NFR BLD AUTO: 31 %
MCH RBC QN AUTO: 28.2 PG (ref 26–34)
MCHC RBC AUTO-ENTMCNC: 32 G/DL (ref 31–37)
MCV RBC AUTO: 88.2 FL
MICROALBUMIN UR-MCNC: 3.07 MG/DL
MICROALBUMIN/CREAT 24H UR-RTO: 61.5 UG/MG (ref ?–30)
MONOCYTES # BLD AUTO: 0.52 X10(3) UL (ref 0.1–1)
MONOCYTES NFR BLD AUTO: 9.3 %
NEUTROPHILS # BLD AUTO: 3.2 X10 (3) UL (ref 1.5–7.7)
NEUTROPHILS # BLD AUTO: 3.2 X10(3) UL (ref 1.5–7.7)
NEUTROPHILS NFR BLD AUTO: 56.8 %
NITRITE UR QL STRIP.AUTO: NEGATIVE
NONHDLC SERPL-MCNC: 93 MG/DL (ref ?–130)
OSMOLALITY SERPL CALC.SUM OF ELEC: 281 MOSM/KG (ref 275–295)
PH UR STRIP.AUTO: 8 [PH] (ref 5–8)
PLATELET # BLD AUTO: 306 10(3)UL (ref 150–450)
POTASSIUM SERPL-SCNC: 4.5 MMOL/L (ref 3.5–5.1)
PROT SERPL-MCNC: 6.5 G/DL (ref 6.4–8.2)
PROT UR STRIP.AUTO-MCNC: NEGATIVE MG/DL
RBC # BLD AUTO: 4.68 X10(6)UL
SODIUM SERPL-SCNC: 135 MMOL/L (ref 136–145)
SP GR UR STRIP.AUTO: 1.01 (ref 1–1.03)
T4 SERPL-MCNC: 13.4 UG/DL
TSI SER-ACNC: 4.69 MIU/ML (ref 0.36–3.74)
UROBILINOGEN UR STRIP.AUTO-MCNC: <2 MG/DL
VIT D+METAB SERPL-MCNC: 56.4 NG/ML (ref 30–100)
VLDLC SERPL CALC-MCNC: 14 MG/DL (ref 0–30)
WBC # BLD AUTO: 5.6 X10(3) UL (ref 4–11)

## 2022-03-02 PROCEDURE — 82043 UR ALBUMIN QUANTITATIVE: CPT

## 2022-03-02 PROCEDURE — 84443 ASSAY THYROID STIM HORMONE: CPT

## 2022-03-02 PROCEDURE — 80053 COMPREHEN METABOLIC PANEL: CPT

## 2022-03-02 PROCEDURE — 81001 URINALYSIS AUTO W/SCOPE: CPT

## 2022-03-02 PROCEDURE — 82570 ASSAY OF URINE CREATININE: CPT

## 2022-03-02 PROCEDURE — 83036 HEMOGLOBIN GLYCOSYLATED A1C: CPT

## 2022-03-02 PROCEDURE — 80061 LIPID PANEL: CPT

## 2022-03-02 PROCEDURE — 84436 ASSAY OF TOTAL THYROXINE: CPT

## 2022-03-02 PROCEDURE — 82306 VITAMIN D 25 HYDROXY: CPT

## 2022-03-02 PROCEDURE — 77080 DXA BONE DENSITY AXIAL: CPT | Performed by: INTERNAL MEDICINE

## 2022-03-02 PROCEDURE — 85025 COMPLETE CBC W/AUTO DIFF WBC: CPT

## 2022-03-09 ENCOUNTER — TELEPHONE (OUTPATIENT)
Dept: INTERNAL MEDICINE CLINIC | Facility: CLINIC | Age: 72
End: 2022-03-09

## 2022-07-14 ENCOUNTER — TELEPHONE (OUTPATIENT)
Dept: INTERNAL MEDICINE CLINIC | Facility: CLINIC | Age: 72
End: 2022-07-14

## 2022-07-14 DIAGNOSIS — Z12.31 ENCOUNTER FOR SCREENING MAMMOGRAM FOR MALIGNANT NEOPLASM OF BREAST: Primary | ICD-10-CM

## 2022-07-26 ENCOUNTER — TELEPHONE (OUTPATIENT)
Dept: INTERNAL MEDICINE CLINIC | Facility: CLINIC | Age: 72
End: 2022-07-26

## 2022-07-26 DIAGNOSIS — E11.9 TYPE 2 DIABETES MELLITUS WITHOUT COMPLICATION, WITH LONG-TERM CURRENT USE OF INSULIN (HCC): ICD-10-CM

## 2022-07-26 DIAGNOSIS — Z79.4 TYPE 2 DIABETES MELLITUS WITHOUT COMPLICATION, WITH LONG-TERM CURRENT USE OF INSULIN (HCC): ICD-10-CM

## 2022-07-26 DIAGNOSIS — E78.00 PURE HYPERCHOLESTEROLEMIA: Primary | ICD-10-CM

## 2022-07-26 NOTE — TELEPHONE ENCOUNTER
Pt scheduled a 6 month follow up, pt would like to do labs prior to appt, labs last completed in March. Please place orders if appropriate.     Future Appointments   Date Time Provider Kandis Deyanira   8/15/2022 11:30 AM Ar Reyez MD [facility] [place]

## 2022-07-29 ENCOUNTER — HOSPITAL ENCOUNTER (OUTPATIENT)
Dept: MAMMOGRAPHY | Age: 72
Discharge: HOME | End: 2022-07-29
Attending: INTERNAL MEDICINE
Payer: MEDICARE

## 2022-07-29 DIAGNOSIS — Z12.31 ENCOUNTER FOR SCREENING MAMMOGRAM FOR MALIGNANT NEOPLASM OF BREAST: ICD-10-CM

## 2022-07-29 PROCEDURE — 77063 BREAST TOMOSYNTHESIS BI: CPT | Performed by: INTERNAL MEDICINE

## 2022-07-29 PROCEDURE — 77067 SCR MAMMO BI INCL CAD: CPT | Performed by: INTERNAL MEDICINE

## 2022-08-15 ENCOUNTER — OFFICE VISIT (OUTPATIENT)
Dept: INTERNAL MEDICINE CLINIC | Facility: CLINIC | Age: 72
End: 2022-08-15
Payer: MEDICARE

## 2022-08-15 VITALS
DIASTOLIC BLOOD PRESSURE: 64 MMHG | WEIGHT: 142.63 LBS | SYSTOLIC BLOOD PRESSURE: 110 MMHG | RESPIRATION RATE: 12 BRPM | BODY MASS INDEX: 24.06 KG/M2 | TEMPERATURE: 98 F | HEIGHT: 64.5 IN | HEART RATE: 85 BPM | OXYGEN SATURATION: 99 %

## 2022-08-15 DIAGNOSIS — Z79.4 TYPE 2 DIABETES MELLITUS WITHOUT COMPLICATION, WITH LONG-TERM CURRENT USE OF INSULIN (HCC): Primary | ICD-10-CM

## 2022-08-15 DIAGNOSIS — E11.9 TYPE 2 DIABETES MELLITUS WITHOUT COMPLICATION, WITH LONG-TERM CURRENT USE OF INSULIN (HCC): Primary | ICD-10-CM

## 2022-08-15 DIAGNOSIS — E78.00 PURE HYPERCHOLESTEROLEMIA: ICD-10-CM

## 2022-08-15 DIAGNOSIS — R63.4 WEIGHT LOSS: ICD-10-CM

## 2022-08-15 PROBLEM — M85.88 OSTEOPENIA OF LUMBAR SPINE: Chronic | Status: ACTIVE | Noted: 2021-11-23

## 2022-08-15 PROCEDURE — 99213 OFFICE O/P EST LOW 20 MIN: CPT | Performed by: INTERNAL MEDICINE

## 2022-08-15 PROCEDURE — 90732 PPSV23 VACC 2 YRS+ SUBQ/IM: CPT | Performed by: INTERNAL MEDICINE

## 2022-08-15 PROCEDURE — G0009 ADMIN PNEUMOCOCCAL VACCINE: HCPCS | Performed by: INTERNAL MEDICINE

## 2022-08-15 RX ORDER — MULTIVIT-MIN/IRON FUM/FOLIC AC 7.5 MG-4
1 TABLET ORAL DAILY
COMMUNITY

## 2022-08-16 ENCOUNTER — TELEPHONE (OUTPATIENT)
Dept: INTERNAL MEDICINE CLINIC | Facility: CLINIC | Age: 72
End: 2022-08-16

## 2022-08-26 DIAGNOSIS — M85.88 OSTEOPENIA OF LUMBAR SPINE: ICD-10-CM

## 2022-08-27 RX ORDER — ALENDRONATE SODIUM 70 MG/1
TABLET ORAL
Qty: 12 TABLET | Refills: 3 | Status: SHIPPED | OUTPATIENT
Start: 2022-08-27

## 2022-08-27 NOTE — TELEPHONE ENCOUNTER
Alendronate 70 mg  Filled 10-28-21  Qty 12  3 refills  No upcoming appt.   LOV 8-15-22   Labs: 3-2-22: CMP  Imaging 3/2/22 Dexa

## 2022-08-29 ENCOUNTER — LAB ENCOUNTER (OUTPATIENT)
Dept: LAB | Age: 72
End: 2022-08-29
Attending: INTERNAL MEDICINE
Payer: MEDICARE

## 2022-08-29 DIAGNOSIS — Z79.4 TYPE 2 DIABETES MELLITUS WITHOUT COMPLICATION, WITH LONG-TERM CURRENT USE OF INSULIN (HCC): ICD-10-CM

## 2022-08-29 DIAGNOSIS — R63.4 WEIGHT LOSS: ICD-10-CM

## 2022-08-29 DIAGNOSIS — E78.00 PURE HYPERCHOLESTEROLEMIA: ICD-10-CM

## 2022-08-29 DIAGNOSIS — E11.9 TYPE 2 DIABETES MELLITUS WITHOUT COMPLICATION, WITH LONG-TERM CURRENT USE OF INSULIN (HCC): ICD-10-CM

## 2022-08-29 LAB
ALBUMIN SERPL-MCNC: 3.2 G/DL (ref 3.4–5)
ALBUMIN/GLOB SERPL: 1.1 {RATIO} (ref 1–2)
ALP LIVER SERPL-CCNC: 92 U/L
ALT SERPL-CCNC: 16 U/L
ANION GAP SERPL CALC-SCNC: 10 MMOL/L (ref 0–18)
AST SERPL-CCNC: 17 U/L (ref 15–37)
BASOPHILS # BLD AUTO: 0.04 X10(3) UL (ref 0–0.2)
BASOPHILS NFR BLD AUTO: 0.7 %
BILIRUB SERPL-MCNC: 0.5 MG/DL (ref 0.1–2)
BUN BLD-MCNC: 8 MG/DL (ref 7–18)
BUN/CREAT SERPL: 11 (ref 10–20)
CALCIUM BLD-MCNC: 9.2 MG/DL (ref 8.5–10.1)
CHLORIDE SERPL-SCNC: 103 MMOL/L (ref 98–112)
CHOLEST SERPL-MCNC: 150 MG/DL (ref ?–200)
CO2 SERPL-SCNC: 24 MMOL/L (ref 21–32)
CREAT BLD-MCNC: 0.73 MG/DL
DEPRECATED RDW RBC AUTO: 45.3 FL (ref 35.1–46.3)
EOSINOPHIL # BLD AUTO: 0.14 X10(3) UL (ref 0–0.7)
EOSINOPHIL NFR BLD AUTO: 2.5 %
ERYTHROCYTE [DISTWIDTH] IN BLOOD BY AUTOMATED COUNT: 14 % (ref 11–15)
EST. AVERAGE GLUCOSE BLD GHB EST-MCNC: 134 MG/DL (ref 68–126)
FASTING PATIENT LIPID ANSWER: YES
FASTING STATUS PATIENT QL REPORTED: YES
GFR SERPLBLD BASED ON 1.73 SQ M-ARVRAT: 88 ML/MIN/1.73M2 (ref 60–?)
GLOBULIN PLAS-MCNC: 2.9 G/DL (ref 2.8–4.4)
GLUCOSE BLD-MCNC: 106 MG/DL (ref 70–99)
HBA1C MFR BLD: 6.3 % (ref ?–5.7)
HCT VFR BLD AUTO: 40.5 %
HDLC SERPL-MCNC: 59 MG/DL (ref 40–59)
HGB BLD-MCNC: 12.4 G/DL
IMM GRANULOCYTES # BLD AUTO: 0.02 X10(3) UL (ref 0–1)
IMM GRANULOCYTES NFR BLD: 0.4 %
LDLC SERPL CALC-MCNC: 72 MG/DL (ref ?–100)
LYMPHOCYTES # BLD AUTO: 1.81 X10(3) UL (ref 1–4)
LYMPHOCYTES NFR BLD AUTO: 32.1 %
MCH RBC QN AUTO: 27.2 PG (ref 26–34)
MCHC RBC AUTO-ENTMCNC: 30.6 G/DL (ref 31–37)
MCV RBC AUTO: 88.8 FL
MONOCYTES # BLD AUTO: 0.53 X10(3) UL (ref 0.1–1)
MONOCYTES NFR BLD AUTO: 9.4 %
NEUTROPHILS # BLD AUTO: 3.1 X10 (3) UL (ref 1.5–7.7)
NEUTROPHILS # BLD AUTO: 3.1 X10(3) UL (ref 1.5–7.7)
NEUTROPHILS NFR BLD AUTO: 54.9 %
NONHDLC SERPL-MCNC: 91 MG/DL (ref ?–130)
OSMOLALITY SERPL CALC.SUM OF ELEC: 283 MOSM/KG (ref 275–295)
PLATELET # BLD AUTO: 365 10(3)UL (ref 150–450)
POTASSIUM SERPL-SCNC: 4.5 MMOL/L (ref 3.5–5.1)
PROT SERPL-MCNC: 6.1 G/DL (ref 6.4–8.2)
RBC # BLD AUTO: 4.56 X10(6)UL
SODIUM SERPL-SCNC: 137 MMOL/L (ref 136–145)
T4 SERPL-MCNC: 12.3 UG/DL
TRIGL SERPL-MCNC: 105 MG/DL (ref 30–149)
TSI SER-ACNC: 2.95 MIU/ML (ref 0.36–3.74)
VLDLC SERPL CALC-MCNC: 16 MG/DL (ref 0–30)
WBC # BLD AUTO: 5.6 X10(3) UL (ref 4–11)

## 2022-08-29 PROCEDURE — 84443 ASSAY THYROID STIM HORMONE: CPT

## 2022-08-29 PROCEDURE — 80053 COMPREHEN METABOLIC PANEL: CPT

## 2022-08-29 PROCEDURE — 83036 HEMOGLOBIN GLYCOSYLATED A1C: CPT

## 2022-08-29 PROCEDURE — 84436 ASSAY OF TOTAL THYROXINE: CPT

## 2022-08-29 PROCEDURE — 85025 COMPLETE CBC W/AUTO DIFF WBC: CPT

## 2022-08-29 PROCEDURE — 80061 LIPID PANEL: CPT

## 2022-12-12 ENCOUNTER — TELEPHONE (OUTPATIENT)
Dept: INTERNAL MEDICINE CLINIC | Facility: CLINIC | Age: 72
End: 2022-12-12

## 2022-12-12 NOTE — TELEPHONE ENCOUNTER
Patient had influenza, covid booster and shringrix #2 on 10/26/22. Request update to chart, stephanie to fax record of vaccine.

## 2023-01-24 ENCOUNTER — OFFICE VISIT (OUTPATIENT)
Dept: INTERNAL MEDICINE CLINIC | Facility: CLINIC | Age: 73
End: 2023-01-24
Payer: MEDICARE

## 2023-01-24 VITALS
TEMPERATURE: 98 F | WEIGHT: 142.81 LBS | DIASTOLIC BLOOD PRESSURE: 80 MMHG | OXYGEN SATURATION: 100 % | RESPIRATION RATE: 16 BRPM | BODY MASS INDEX: 24.09 KG/M2 | HEART RATE: 70 BPM | HEIGHT: 64.5 IN | SYSTOLIC BLOOD PRESSURE: 102 MMHG

## 2023-01-24 DIAGNOSIS — M85.88 OSTEOPENIA OF LUMBAR SPINE: Chronic | ICD-10-CM

## 2023-01-24 DIAGNOSIS — E11.00 TYPE 2 DIABETES MELLITUS WITH HYPEROSMOLARITY WITHOUT COMA, WITH LONG-TERM CURRENT USE OF INSULIN (HCC): Chronic | ICD-10-CM

## 2023-01-24 DIAGNOSIS — Z00.00 ROUTINE GENERAL MEDICAL EXAMINATION AT A HEALTH CARE FACILITY: Primary | ICD-10-CM

## 2023-01-24 DIAGNOSIS — Z79.4 TYPE 2 DIABETES MELLITUS WITH HYPEROSMOLARITY WITHOUT COMA, WITH LONG-TERM CURRENT USE OF INSULIN (HCC): Chronic | ICD-10-CM

## 2023-01-24 DIAGNOSIS — E78.00 PURE HYPERCHOLESTEROLEMIA: Chronic | ICD-10-CM

## 2023-01-24 LAB
ATRIAL RATE: 60 BPM
P AXIS: 20 DEGREES
P-R INTERVAL: 152 MS
Q-T INTERVAL: 372 MS
QRS DURATION: 68 MS
QTC CALCULATION (BEZET): 372 MS
R AXIS: 29 DEGREES
T AXIS: 30 DEGREES
VENTRICULAR RATE: 60 BPM

## 2023-01-24 PROCEDURE — G0439 PPPS, SUBSEQ VISIT: HCPCS | Performed by: INTERNAL MEDICINE

## 2023-01-24 PROCEDURE — 93000 ELECTROCARDIOGRAM COMPLETE: CPT | Performed by: INTERNAL MEDICINE

## 2023-03-06 ENCOUNTER — LAB ENCOUNTER (OUTPATIENT)
Dept: LAB | Age: 73
End: 2023-03-06
Attending: INTERNAL MEDICINE
Payer: MEDICARE

## 2023-03-06 DIAGNOSIS — Z79.4 TYPE 2 DIABETES MELLITUS WITHOUT COMPLICATION, WITH LONG-TERM CURRENT USE OF INSULIN (HCC): ICD-10-CM

## 2023-03-06 DIAGNOSIS — E78.00 PURE HYPERCHOLESTEROLEMIA: ICD-10-CM

## 2023-03-06 DIAGNOSIS — E11.00 TYPE 2 DIABETES MELLITUS WITH HYPEROSMOLARITY WITHOUT COMA, WITH LONG-TERM CURRENT USE OF INSULIN (HCC): Chronic | ICD-10-CM

## 2023-03-06 DIAGNOSIS — Z79.4 TYPE 2 DIABETES MELLITUS WITH HYPEROSMOLARITY WITHOUT COMA, WITH LONG-TERM CURRENT USE OF INSULIN (HCC): Chronic | ICD-10-CM

## 2023-03-06 DIAGNOSIS — Z00.00 ROUTINE GENERAL MEDICAL EXAMINATION AT A HEALTH CARE FACILITY: ICD-10-CM

## 2023-03-06 DIAGNOSIS — E11.9 TYPE 2 DIABETES MELLITUS WITHOUT COMPLICATION, WITH LONG-TERM CURRENT USE OF INSULIN (HCC): ICD-10-CM

## 2023-03-06 LAB
ALBUMIN SERPL-MCNC: 3.4 G/DL (ref 3.4–5)
ALBUMIN/GLOB SERPL: 1.3 {RATIO} (ref 1–2)
ALP LIVER SERPL-CCNC: 61 U/L
ALT SERPL-CCNC: 19 U/L
ANION GAP SERPL CALC-SCNC: 3 MMOL/L (ref 0–18)
AST SERPL-CCNC: 23 U/L (ref 15–37)
BASOPHILS # BLD AUTO: 0.04 X10(3) UL (ref 0–0.2)
BASOPHILS NFR BLD AUTO: 0.9 %
BILIRUB SERPL-MCNC: 0.4 MG/DL (ref 0.1–2)
BILIRUB UR QL STRIP.AUTO: NEGATIVE
BUN BLD-MCNC: 8 MG/DL (ref 7–18)
CALCIUM BLD-MCNC: 8.9 MG/DL (ref 8.5–10.1)
CHLORIDE SERPL-SCNC: 111 MMOL/L (ref 98–112)
CHOLEST SERPL-MCNC: 167 MG/DL (ref ?–200)
CLARITY UR REFRACT.AUTO: CLEAR
CO2 SERPL-SCNC: 25 MMOL/L (ref 21–32)
COLOR UR AUTO: YELLOW
CREAT BLD-MCNC: 0.71 MG/DL
CREAT UR-SCNC: 155 MG/DL
EOSINOPHIL # BLD AUTO: 0.09 X10(3) UL (ref 0–0.7)
EOSINOPHIL NFR BLD AUTO: 2 %
ERYTHROCYTE [DISTWIDTH] IN BLOOD BY AUTOMATED COUNT: 14.8 %
EST. AVERAGE GLUCOSE BLD GHB EST-MCNC: 137 MG/DL (ref 68–126)
FASTING PATIENT LIPID ANSWER: YES
FASTING STATUS PATIENT QL REPORTED: YES
GFR SERPLBLD BASED ON 1.73 SQ M-ARVRAT: 90 ML/MIN/1.73M2 (ref 60–?)
GLOBULIN PLAS-MCNC: 2.6 G/DL (ref 2.8–4.4)
GLUCOSE BLD-MCNC: 122 MG/DL (ref 70–99)
GLUCOSE UR STRIP.AUTO-MCNC: NEGATIVE MG/DL
HBA1C MFR BLD: 6.4 % (ref ?–5.7)
HCT VFR BLD AUTO: 40.3 %
HDLC SERPL-MCNC: 82 MG/DL (ref 40–59)
HGB BLD-MCNC: 12.7 G/DL
HYALINE CASTS #/AREA URNS AUTO: PRESENT /LPF
IMM GRANULOCYTES # BLD AUTO: 0.01 X10(3) UL (ref 0–1)
IMM GRANULOCYTES NFR BLD: 0.2 %
KETONES UR STRIP.AUTO-MCNC: NEGATIVE MG/DL
LDLC SERPL CALC-MCNC: 70 MG/DL (ref ?–100)
LEUKOCYTE ESTERASE UR QL STRIP.AUTO: NEGATIVE
LYMPHOCYTES # BLD AUTO: 1.66 X10(3) UL (ref 1–4)
LYMPHOCYTES NFR BLD AUTO: 36.6 %
MCH RBC QN AUTO: 28.1 PG (ref 26–34)
MCHC RBC AUTO-ENTMCNC: 31.5 G/DL (ref 31–37)
MCV RBC AUTO: 89.2 FL
MICROALBUMIN UR-MCNC: 8.16 MG/DL
MICROALBUMIN/CREAT 24H UR-RTO: 52.6 UG/MG (ref ?–30)
MONOCYTES # BLD AUTO: 0.35 X10(3) UL (ref 0.1–1)
MONOCYTES NFR BLD AUTO: 7.7 %
NEUTROPHILS # BLD AUTO: 2.38 X10 (3) UL (ref 1.5–7.7)
NEUTROPHILS # BLD AUTO: 2.38 X10(3) UL (ref 1.5–7.7)
NEUTROPHILS NFR BLD AUTO: 52.6 %
NITRITE UR QL STRIP.AUTO: NEGATIVE
NONHDLC SERPL-MCNC: 85 MG/DL (ref ?–130)
OSMOLALITY SERPL CALC.SUM OF ELEC: 288 MOSM/KG (ref 275–295)
PH UR STRIP.AUTO: 7 [PH] (ref 5–8)
PLATELET # BLD AUTO: 246 10(3)UL (ref 150–450)
POTASSIUM SERPL-SCNC: 4.6 MMOL/L (ref 3.5–5.1)
PROT SERPL-MCNC: 6 G/DL (ref 6.4–8.2)
PROT UR STRIP.AUTO-MCNC: 30 MG/DL
RBC # BLD AUTO: 4.52 X10(6)UL
SODIUM SERPL-SCNC: 139 MMOL/L (ref 136–145)
SP GR UR STRIP.AUTO: 1.01 (ref 1–1.03)
T4 SERPL-MCNC: 14 UG/DL
TRIGL SERPL-MCNC: 82 MG/DL (ref 30–149)
TSI SER-ACNC: 3.09 MIU/ML (ref 0.36–3.74)
UROBILINOGEN UR STRIP.AUTO-MCNC: <2 MG/DL
VLDLC SERPL CALC-MCNC: 12 MG/DL (ref 0–30)
WBC # BLD AUTO: 4.5 X10(3) UL (ref 4–11)

## 2023-03-06 PROCEDURE — 81001 URINALYSIS AUTO W/SCOPE: CPT

## 2023-03-06 PROCEDURE — 82043 UR ALBUMIN QUANTITATIVE: CPT

## 2023-03-06 PROCEDURE — 80053 COMPREHEN METABOLIC PANEL: CPT

## 2023-03-06 PROCEDURE — 80061 LIPID PANEL: CPT

## 2023-03-06 PROCEDURE — 83036 HEMOGLOBIN GLYCOSYLATED A1C: CPT

## 2023-03-06 PROCEDURE — 85025 COMPLETE CBC W/AUTO DIFF WBC: CPT

## 2023-03-06 PROCEDURE — 84443 ASSAY THYROID STIM HORMONE: CPT

## 2023-03-06 PROCEDURE — 84436 ASSAY OF TOTAL THYROXINE: CPT

## 2023-03-06 PROCEDURE — 82570 ASSAY OF URINE CREATININE: CPT

## 2023-05-26 DIAGNOSIS — E11.9 TYPE 2 DIABETES MELLITUS WITHOUT COMPLICATION, WITH LONG-TERM CURRENT USE OF INSULIN (HCC): ICD-10-CM

## 2023-05-26 DIAGNOSIS — Z79.4 TYPE 2 DIABETES MELLITUS WITHOUT COMPLICATION, WITH LONG-TERM CURRENT USE OF INSULIN (HCC): ICD-10-CM

## 2023-09-26 ENCOUNTER — OFFICE VISIT (OUTPATIENT)
Dept: INTERNAL MEDICINE CLINIC | Facility: CLINIC | Age: 73
End: 2023-09-26
Payer: MEDICARE

## 2023-09-26 VITALS
BODY MASS INDEX: 24.29 KG/M2 | OXYGEN SATURATION: 98 % | HEIGHT: 64.5 IN | DIASTOLIC BLOOD PRESSURE: 60 MMHG | WEIGHT: 144 LBS | HEART RATE: 71 BPM | TEMPERATURE: 98 F | RESPIRATION RATE: 14 BRPM | SYSTOLIC BLOOD PRESSURE: 108 MMHG

## 2023-09-26 DIAGNOSIS — Z12.31 BREAST CANCER SCREENING BY MAMMOGRAM: ICD-10-CM

## 2023-09-26 DIAGNOSIS — E11.00 TYPE 2 DIABETES MELLITUS WITH HYPEROSMOLARITY WITHOUT COMA, WITH LONG-TERM CURRENT USE OF INSULIN (HCC): Chronic | ICD-10-CM

## 2023-09-26 DIAGNOSIS — Z79.4 TYPE 2 DIABETES MELLITUS WITH HYPEROSMOLARITY WITHOUT COMA, WITH LONG-TERM CURRENT USE OF INSULIN (HCC): Chronic | ICD-10-CM

## 2023-09-26 DIAGNOSIS — E78.00 PURE HYPERCHOLESTEROLEMIA: Primary | Chronic | ICD-10-CM

## 2023-09-26 PROCEDURE — 99213 OFFICE O/P EST LOW 20 MIN: CPT | Performed by: INTERNAL MEDICINE

## 2023-09-27 DIAGNOSIS — M85.88 OSTEOPENIA OF LUMBAR SPINE: ICD-10-CM

## 2023-09-28 RX ORDER — ALENDRONATE SODIUM 70 MG/1
70 TABLET ORAL
Qty: 12 TABLET | Refills: 3 | Status: SHIPPED | OUTPATIENT
Start: 2023-09-28

## 2023-09-28 NOTE — TELEPHONE ENCOUNTER
LOV: 9/26/23   RTC: 6 months   Filled\" 8/27/22 # 12 3 refills   Labs: 3/6/23   No future appointments.

## 2023-10-02 ENCOUNTER — TELEPHONE (OUTPATIENT)
Dept: INTERNAL MEDICINE CLINIC | Facility: CLINIC | Age: 73
End: 2023-10-02

## 2024-02-08 ENCOUNTER — LAB ENCOUNTER (OUTPATIENT)
Dept: LAB | Age: 74
End: 2024-02-08
Attending: INTERNAL MEDICINE
Payer: MEDICARE

## 2024-02-08 ENCOUNTER — HOSPITAL ENCOUNTER (OUTPATIENT)
Dept: MAMMOGRAPHY | Age: 74
Discharge: HOME | End: 2024-02-08
Attending: INTERNAL MEDICINE
Payer: MEDICARE

## 2024-02-08 DIAGNOSIS — E78.00 PURE HYPERCHOLESTEROLEMIA: Chronic | ICD-10-CM

## 2024-02-08 DIAGNOSIS — Z12.31 BREAST CANCER SCREENING BY MAMMOGRAM: ICD-10-CM

## 2024-02-08 DIAGNOSIS — Z79.4 TYPE 2 DIABETES MELLITUS WITH HYPEROSMOLARITY WITHOUT COMA, WITH LONG-TERM CURRENT USE OF INSULIN (HCC): Chronic | ICD-10-CM

## 2024-02-08 DIAGNOSIS — E11.00 TYPE 2 DIABETES MELLITUS WITH HYPEROSMOLARITY WITHOUT COMA, WITH LONG-TERM CURRENT USE OF INSULIN (HCC): Chronic | ICD-10-CM

## 2024-02-08 LAB
ALBUMIN SERPL-MCNC: 3.6 G/DL (ref 3.4–5)
ALBUMIN/GLOB SERPL: 1.2 {RATIO} (ref 1–2)
ALP LIVER SERPL-CCNC: 58 U/L
ALT SERPL-CCNC: 21 U/L
ANION GAP SERPL CALC-SCNC: 5 MMOL/L (ref 0–18)
AST SERPL-CCNC: 24 U/L (ref 15–37)
BILIRUB SERPL-MCNC: 0.6 MG/DL (ref 0.1–2)
BUN BLD-MCNC: 10 MG/DL (ref 9–23)
CALCIUM BLD-MCNC: 9.2 MG/DL (ref 8.5–10.1)
CHLORIDE SERPL-SCNC: 107 MMOL/L (ref 98–112)
CHOLEST SERPL-MCNC: 177 MG/DL (ref ?–200)
CO2 SERPL-SCNC: 26 MMOL/L (ref 21–32)
CREAT BLD-MCNC: 0.69 MG/DL
EGFRCR SERPLBLD CKD-EPI 2021: 92 ML/MIN/1.73M2 (ref 60–?)
EST. AVERAGE GLUCOSE BLD GHB EST-MCNC: 134 MG/DL (ref 68–126)
FASTING PATIENT LIPID ANSWER: YES
FASTING STATUS PATIENT QL REPORTED: YES
GLOBULIN PLAS-MCNC: 2.9 G/DL (ref 2.8–4.4)
GLUCOSE BLD-MCNC: 114 MG/DL (ref 70–99)
HBA1C MFR BLD: 6.3 % (ref ?–5.7)
HDLC SERPL-MCNC: 88 MG/DL (ref 40–59)
LDLC SERPL CALC-MCNC: 77 MG/DL (ref ?–100)
NONHDLC SERPL-MCNC: 89 MG/DL (ref ?–130)
OSMOLALITY SERPL CALC.SUM OF ELEC: 286 MOSM/KG (ref 275–295)
POTASSIUM SERPL-SCNC: 4.9 MMOL/L (ref 3.5–5.1)
PROT SERPL-MCNC: 6.5 G/DL (ref 6.4–8.2)
SODIUM SERPL-SCNC: 138 MMOL/L (ref 136–145)
TRIGL SERPL-MCNC: 65 MG/DL (ref 30–149)
TSI SER-ACNC: 3.18 MIU/ML (ref 0.36–3.74)
VLDLC SERPL CALC-MCNC: 10 MG/DL (ref 0–30)

## 2024-02-08 PROCEDURE — 80061 LIPID PANEL: CPT

## 2024-02-08 PROCEDURE — 83036 HEMOGLOBIN GLYCOSYLATED A1C: CPT

## 2024-02-08 PROCEDURE — 80053 COMPREHEN METABOLIC PANEL: CPT

## 2024-02-08 PROCEDURE — 77063 BREAST TOMOSYNTHESIS BI: CPT | Performed by: INTERNAL MEDICINE

## 2024-02-08 PROCEDURE — 84443 ASSAY THYROID STIM HORMONE: CPT

## 2024-02-08 PROCEDURE — 77067 SCR MAMMO BI INCL CAD: CPT | Performed by: INTERNAL MEDICINE

## 2024-02-19 ENCOUNTER — OFFICE VISIT (OUTPATIENT)
Dept: INTERNAL MEDICINE CLINIC | Facility: CLINIC | Age: 74
End: 2024-02-19
Payer: MEDICARE

## 2024-02-19 VITALS
OXYGEN SATURATION: 96 % | HEIGHT: 64.5 IN | BODY MASS INDEX: 24.79 KG/M2 | HEART RATE: 68 BPM | RESPIRATION RATE: 16 BRPM | DIASTOLIC BLOOD PRESSURE: 80 MMHG | SYSTOLIC BLOOD PRESSURE: 132 MMHG | WEIGHT: 147 LBS | TEMPERATURE: 98 F

## 2024-02-19 DIAGNOSIS — E78.00 PURE HYPERCHOLESTEROLEMIA: Chronic | ICD-10-CM

## 2024-02-19 DIAGNOSIS — E08.21 DIABETES MELLITUS DUE TO UNDERLYING CONDITION, CONTROLLED, WITH DIABETIC NEPHROPATHY, WITHOUT LONG-TERM CURRENT USE OF INSULIN (HCC): ICD-10-CM

## 2024-02-19 DIAGNOSIS — M85.88 OSTEOPENIA OF LUMBAR SPINE: Chronic | ICD-10-CM

## 2024-02-19 DIAGNOSIS — Z00.00 ROUTINE GENERAL MEDICAL EXAMINATION AT A HEALTH CARE FACILITY: Primary | ICD-10-CM

## 2024-02-19 PROBLEM — E08.9: Status: ACTIVE | Noted: 2024-02-19

## 2024-02-19 PROBLEM — E11.9 TYPE 2 DIABETES MELLITUS, WITH LONG-TERM CURRENT USE OF INSULIN (HCC): Chronic | Status: RESOLVED | Noted: 2019-03-25 | Resolved: 2024-02-19

## 2024-02-19 PROBLEM — I21.9 MYOCARDIAL INFARCTION (HCC): Status: ACTIVE | Noted: 2024-02-19

## 2024-02-19 PROBLEM — I21.9 MYOCARDIAL INFARCTION (HCC): Status: RESOLVED | Noted: 2024-02-19 | Resolved: 2024-02-19

## 2024-02-19 PROBLEM — Z79.4 TYPE 2 DIABETES MELLITUS, WITH LONG-TERM CURRENT USE OF INSULIN (HCC): Chronic | Status: RESOLVED | Noted: 2019-03-25 | Resolved: 2024-02-19

## 2024-02-19 LAB
ATRIAL RATE: 65 BPM
P AXIS: 42 DEGREES
P-R INTERVAL: 138 MS
Q-T INTERVAL: 396 MS
QRS DURATION: 66 MS
QTC CALCULATION (BEZET): 411 MS
R AXIS: 27 DEGREES
T AXIS: 27 DEGREES
VENTRICULAR RATE: 65 BPM

## 2024-02-19 PROCEDURE — 93000 ELECTROCARDIOGRAM COMPLETE: CPT | Performed by: INTERNAL MEDICINE

## 2024-02-19 PROCEDURE — G0439 PPPS, SUBSEQ VISIT: HCPCS | Performed by: INTERNAL MEDICINE

## 2024-02-19 RX ORDER — LISINOPRIL 5 MG/1
5 TABLET ORAL NIGHTLY
COMMUNITY
Start: 2023-10-30 | End: 2024-02-19 | Stop reason: DRUGHIGH

## 2024-02-19 NOTE — PROGRESS NOTES
Julian Shields  9/10/1950 is a 73 year old female.    Chief Complaint   Patient presents with    Annual     MAW       HPI:     Current Outpatient Medications   Medication Sig Dispense Refill    ALENDRONATE 70 MG Oral Tab TAKE 1 TABLET BY MOUTH  WEEKLY WITH 8 OZ OF PLAIN  WATER 30 MINUTES BEFORE  FIRST FOOD, DRINK OR MEDS.  STAY UPRIGHT FOR 30 MINS 12 tablet 3    METFORMIN HCL 1000 MG Oral Tab TAKE 1 TABLET BY MOUTH  TWICE DAILY WITH MEALS (Patient taking differently: Take 1 tablet (1,000 mg total) by mouth 2 (two) times daily with meals.) 180 tablet 3    Multiple Vitamins-Minerals (MULTI-VITAMIN/MINERALS) Oral Tab Take 1 tablet by mouth daily.      Cholecalciferol (VITAMIN D) 2000 units Oral Tab Take 1 tablet by mouth daily. 90 tablet 3    Netarsudil Dimesylate 0.02 % Ophthalmic Solution Place 1 drop into the left eye nightly.      lisinopril (PRINIVIL,ZESTRIL) 2.5 MG Oral Tab Take 1 tablet (2.5 mg total) by mouth daily. 90 tablet 0    Brinzolamide 1 % Ophthalmic Suspension Place 1 drop into both eyes 2 (two) times daily.      Calcium Carbonate (CALCIUM 600 OR) Take 1 tablet by mouth daily.      Timolol Maleate (TIMOPTIC) 0.5 % Ophthalmic Solution Place 1 drop into both eyes 2 (two) times daily.  2    Pioglitazone HCl (ACTOS) 30 MG Oral Tab Take 1 tablet (30 mg total) by mouth daily.  2    Atorvastatin Calcium (LIPITOR) 40 MG Oral Tab Take 1 tablet (40 mg total) by mouth daily.      Bimatoprost 0.01 % Ophthalmic Solution Place 1 drop into both eyes nightly.        No Known Allergies   Past Medical History:   Diagnosis Date    Anemia, unspecified     History of cataract surgery     Left Eye    Osteopenia     Pure hypercholesterolemia     Type II or unspecified type diabetes mellitus without mention of complication, not stated as uncontrolled       Social History:  Social History     Socioeconomic History    Marital status:    Tobacco Use    Smoking status: Never    Smokeless tobacco: Never   Vaping Use     Vaping Use: Never used   Substance and Sexual Activity    Alcohol use: No    Drug use: No   Other Topics Concern    Caffeine Concern No    Exercise No    Seat Belt No    Special Diet No    Stress Concern No    Weight Concern No          REVIEW OF SYSTEMS:   General/Constitutional:   General able to do usual activities, good exercise tolerance, good general state of health, no fatigue, no fever, no weakness, no weight loss or gain .   HEENT/Neck:   Head no headache, no dizziness, no lightheadedness. Eyes patient is legally blind secondary to glaucoma does go to  Dr GUSTAVO Owen  no redness, no drainage. Ears no earaches, no fullness, normal hearing, no tinnitus. Nose and Sinuses no colds, no discharge, no hay fever, no sinus trouble. Mouth and Pharynx no sore throats, no hoarseness. Neck no lumps, no goiter, no neck stiffness or pain.   Endocrine:   Diabetes 1999-stable   Thyroid disorder none.   Respiratory:   Patient denies chest pain, cough, POWERS (dyspnea on exertion), chest congestion, blood-tinged sputum,wheezing. Breathing normal pattern .   Cardiovascular:   Patient denies chest pain, shortness of breath/rheumatic fever/murmur/dizzziness cholesterol normal. Leg edema none. Orthopnea none. Palpitations none. PND (paroxsymal nocturnal dyspnea) none.  Last cardiac stress possible Lexiscan in 2016-never went -patient reluctant to get a cardiac opinion.  Gastrointestinal:   Patient denies abdominal pain, blood in stool, constipation, diarrhea, difficulty swallowing, change in stools, nausea, vomiting no weight changes noted no heart burn noted.- Colonoscopy 11 years ago-refused colon  Hematology:   Patient denies abnormal bleeding, easy bleeding, easy bruising. Enlarged lymph nodes none.   Women Only:   Patient denies breast pain.axillary nodes . Breast lumps or discharge none. Mammogram up-to-date yes   Genitourinary:   Patient denies difficulty urinating, frequent urination, hematuria. Dysuria none. Nocturia None.  Urinary frequency no. Urinary incontinence no.   Musculoskeletal:   Arthritis No. Back pain no. Joint pain no. Joint stiffness no. Muscle weakness none.   Peripheral Vascular:   General no varicosities, no claudication.   Dermatologic:   Rash no.   Neurologic:   Patient denies dizziness, fainting, loss of consciousness, weakness. Memory loss none. Tingling/numbness none. Trouble with balance none.   Psychiatric: none            EXAM:   /80 (BP Location: Left arm, Patient Position: Sitting, Cuff Size: adult)   Pulse 68   Temp 97.9 °F (36.6 °C) (Temporal)   Resp 16   Ht 5' 4.5\" (1.638 m)   Wt 147 lb (66.7 kg)   SpO2 96%   BMI 24.84 kg/m²   GENERAL:   Build: normal .   General Appearance: alert and oriented, pleasant.   HEENT:   Ear canals: normal.   EOM: within normal limit-conjunctiva normal.   Head: normocephalic.   Nasal septum: midline.   Nose: unremarkable.   Oral cavity: normal.   Pupils: normal, bilaterally .   Sclera: normal.  Entire mildly injected  Turbinates: normal.   NECK:   Carotid bruit: none.   Cervical lymph nodes: unremarkable.   JVD: none.   Range of Motion: normal.   Thyroid: unremarkable.   HEART:   Clicks: absent .   Heart sounds: normal.   Murmurs: none.   Rhythm: regular.   BREASTS:   Per gyn   LUNGS:   Auscultation: clear .   Chest Shape: normal .   Percussion: normal.   Rales: no .   Respiratory effort: normal .   Rhonchi: no.   Wheezes: no.   ABDOMEN:   General: normal.   Hernia: absent.   Inguinal nodes: none.   Liver, Spleen: non-enlarged.   Rebound tenderness: absent.   Tenderness: absent .   EXTREMITIES:   Clubbing: none.   Cyanosis: absent .   Edema: none.   Pulses: present, bilateral.   Tremors: no.   Varicose veins: not present.   MUSCULOSKELETAL:   Cervical spines: normal.   L-S spines: normal.   Lower extremity joints: normal.  Mild OA knees  Upper extremity joints: normal.   NEUROLOGICAL:   Babinski: negative/all reflexes are normal.   Cerebellar Testing  grossly/intact: yes.   Cranial Nerves: CN's II-XII grossly intact.   Gait: normal.   Mental Status: Alert & oriented x 3.   Motor: power-normal/tone -normal/co-ordination normal/wasting -none/involuntary movements -none.   Reflexes: normal.   Sensory: normal sensation to all modalities.   LYMPHATICS:   Groin: no adenopathy .   Inguinal: no adenopathy.   Supraclavicular: none.   DERMATOLOGY:   Rash: no.      ASSESSMENT AND PLAN:   Julian was seen today for annual.    Diagnoses and all orders for this visit:    Routine general medical examination at a health care facility  -     Assay, Thyroid Stim Hormone; Future  -     CBC With Differential With Platelet; Future  -     Comp Metabolic Panel (14); Future  -     Occult Blood, Fecal, Immunoassay (Blue cards) [E]; Future    Pure hypercholesterolemia    Diabetes mellitus due to underlying condition, controlled, with diabetic nephropathy, without long-term current use of insulin (HCC)  -     EKG with interpretation and Report -IN OFFICE [[id number]]  -     Hemoglobin A1C; Future  -     MICROALBUMIN, RANDOM URINE; Future    Osteopenia of lumbar spine  -     XR DEXA BONE DENSITOMETRY (CPT=77080); Future  -     Vitamin D; Future        There are no Patient Instructions on file for this visit.     The patient indicates understanding of these issues and agrees to the plan.  The patient is asked to No follow-ups on file.  .    Milan Perez MD

## 2024-06-12 ENCOUNTER — TELEPHONE (OUTPATIENT)
Dept: INTERNAL MEDICINE CLINIC | Facility: CLINIC | Age: 74
End: 2024-06-12

## 2024-08-06 LAB — HM DEXA SCAN: ABNORMAL

## 2024-08-16 ENCOUNTER — HOSPITAL ENCOUNTER (OUTPATIENT)
Dept: BONE DENSITY | Age: 74
Discharge: HOME | End: 2024-08-16
Attending: INTERNAL MEDICINE
Payer: MEDICARE

## 2024-08-16 ENCOUNTER — LAB ENCOUNTER (OUTPATIENT)
Dept: LAB | Age: 74
End: 2024-08-16
Attending: INTERNAL MEDICINE
Payer: MEDICARE

## 2024-08-16 DIAGNOSIS — E08.21 DIABETES MELLITUS DUE TO UNDERLYING CONDITION, CONTROLLED, WITH DIABETIC NEPHROPATHY, WITHOUT LONG-TERM CURRENT USE OF INSULIN (HCC): ICD-10-CM

## 2024-08-16 DIAGNOSIS — M85.88 OSTEOPENIA OF LUMBAR SPINE: ICD-10-CM

## 2024-08-16 DIAGNOSIS — M85.88 OSTEOPENIA OF LUMBAR SPINE: Chronic | ICD-10-CM

## 2024-08-16 DIAGNOSIS — Z00.00 ROUTINE GENERAL MEDICAL EXAMINATION AT A HEALTH CARE FACILITY: ICD-10-CM

## 2024-08-16 LAB
ALBUMIN SERPL-MCNC: 4 G/DL (ref 3.2–4.8)
ALBUMIN/GLOB SERPL: 1.9 {RATIO} (ref 1–2)
ALP LIVER SERPL-CCNC: 69 U/L
ALT SERPL-CCNC: 12 U/L
ANION GAP SERPL CALC-SCNC: 4 MMOL/L (ref 0–18)
AST SERPL-CCNC: 22 U/L (ref ?–34)
BASOPHILS # BLD AUTO: 0.04 X10(3) UL (ref 0–0.2)
BASOPHILS NFR BLD AUTO: 0.7 %
BILIRUB SERPL-MCNC: 0.5 MG/DL (ref 0.2–1.1)
BUN BLD-MCNC: 8 MG/DL (ref 9–23)
CALCIUM BLD-MCNC: 9.7 MG/DL (ref 8.7–10.4)
CHLORIDE SERPL-SCNC: 105 MMOL/L (ref 98–112)
CO2 SERPL-SCNC: 24 MMOL/L (ref 21–32)
CREAT BLD-MCNC: 0.77 MG/DL
CREAT UR-SCNC: 29.4 MG/DL
EGFRCR SERPLBLD CKD-EPI 2021: 81 ML/MIN/1.73M2 (ref 60–?)
EOSINOPHIL # BLD AUTO: 0.13 X10(3) UL (ref 0–0.7)
EOSINOPHIL NFR BLD AUTO: 2.3 %
ERYTHROCYTE [DISTWIDTH] IN BLOOD BY AUTOMATED COUNT: 15.4 %
EST. AVERAGE GLUCOSE BLD GHB EST-MCNC: 123 MG/DL (ref 68–126)
FASTING STATUS PATIENT QL REPORTED: YES
GLOBULIN PLAS-MCNC: 2.1 G/DL (ref 2–3.5)
GLUCOSE BLD-MCNC: 95 MG/DL (ref 70–99)
HBA1C MFR BLD: 5.9 % (ref ?–5.7)
HCT VFR BLD AUTO: 37.6 %
HGB BLD-MCNC: 11.9 G/DL
IMM GRANULOCYTES # BLD AUTO: 0.02 X10(3) UL (ref 0–1)
IMM GRANULOCYTES NFR BLD: 0.4 %
LYMPHOCYTES # BLD AUTO: 1.58 X10(3) UL (ref 1–4)
LYMPHOCYTES NFR BLD AUTO: 28 %
MCH RBC QN AUTO: 28.4 PG (ref 26–34)
MCHC RBC AUTO-ENTMCNC: 31.6 G/DL (ref 31–37)
MCV RBC AUTO: 89.7 FL
MICROALBUMIN UR-MCNC: 1.8 MG/DL
MICROALBUMIN/CREAT 24H UR-RTO: 61.2 UG/MG (ref ?–30)
MONOCYTES # BLD AUTO: 0.59 X10(3) UL (ref 0.1–1)
MONOCYTES NFR BLD AUTO: 10.5 %
NEUTROPHILS # BLD AUTO: 3.28 X10 (3) UL (ref 1.5–7.7)
NEUTROPHILS # BLD AUTO: 3.28 X10(3) UL (ref 1.5–7.7)
NEUTROPHILS NFR BLD AUTO: 58.1 %
OSMOLALITY SERPL CALC.SUM OF ELEC: 274 MOSM/KG (ref 275–295)
PLATELET # BLD AUTO: 253 10(3)UL (ref 150–450)
POTASSIUM SERPL-SCNC: 4.7 MMOL/L (ref 3.5–5.1)
PROT SERPL-MCNC: 6.1 G/DL (ref 5.7–8.2)
RBC # BLD AUTO: 4.19 X10(6)UL
SODIUM SERPL-SCNC: 133 MMOL/L (ref 136–145)
TSI SER-ACNC: 2.41 MIU/ML (ref 0.55–4.78)
VIT D+METAB SERPL-MCNC: 77.1 NG/ML (ref 30–100)
WBC # BLD AUTO: 5.6 X10(3) UL (ref 4–11)

## 2024-08-16 PROCEDURE — 82043 UR ALBUMIN QUANTITATIVE: CPT

## 2024-08-16 PROCEDURE — 83036 HEMOGLOBIN GLYCOSYLATED A1C: CPT

## 2024-08-16 PROCEDURE — 84443 ASSAY THYROID STIM HORMONE: CPT

## 2024-08-16 PROCEDURE — 85025 COMPLETE CBC W/AUTO DIFF WBC: CPT

## 2024-08-16 PROCEDURE — 82306 VITAMIN D 25 HYDROXY: CPT

## 2024-08-16 PROCEDURE — 77080 DXA BONE DENSITY AXIAL: CPT | Performed by: INTERNAL MEDICINE

## 2024-08-16 PROCEDURE — 80053 COMPREHEN METABOLIC PANEL: CPT

## 2024-08-16 PROCEDURE — 82570 ASSAY OF URINE CREATININE: CPT

## 2024-09-03 DIAGNOSIS — D64.9 ANEMIA, UNSPECIFIED TYPE: ICD-10-CM

## 2024-09-03 DIAGNOSIS — M85.80 OSTEOPENIA, UNSPECIFIED LOCATION: Primary | ICD-10-CM

## 2024-09-18 ENCOUNTER — LAB ENCOUNTER (OUTPATIENT)
Dept: LAB | Age: 74
End: 2024-09-18
Attending: INTERNAL MEDICINE
Payer: MEDICARE

## 2024-09-18 DIAGNOSIS — Z00.00 ROUTINE GENERAL MEDICAL EXAMINATION AT A HEALTH CARE FACILITY: ICD-10-CM

## 2024-09-18 DIAGNOSIS — D64.9 ANEMIA, UNSPECIFIED TYPE: ICD-10-CM

## 2024-09-18 LAB
BASOPHILS # BLD AUTO: 0.04 X10(3) UL (ref 0–0.2)
BASOPHILS NFR BLD AUTO: 0.7 %
EOSINOPHIL # BLD AUTO: 0.3 X10(3) UL (ref 0–0.7)
EOSINOPHIL NFR BLD AUTO: 5 %
ERYTHROCYTE [DISTWIDTH] IN BLOOD BY AUTOMATED COUNT: 16.2 %
HCT VFR BLD AUTO: 37.5 %
HGB BLD-MCNC: 12 G/DL
IMM GRANULOCYTES # BLD AUTO: 0.03 X10(3) UL (ref 0–1)
IMM GRANULOCYTES NFR BLD: 0.5 %
LYMPHOCYTES # BLD AUTO: 1.11 X10(3) UL (ref 1–4)
LYMPHOCYTES NFR BLD AUTO: 18.6 %
MCH RBC QN AUTO: 28.5 PG (ref 26–34)
MCHC RBC AUTO-ENTMCNC: 32 G/DL (ref 31–37)
MCV RBC AUTO: 89.1 FL
MONOCYTES # BLD AUTO: 0.71 X10(3) UL (ref 0.1–1)
MONOCYTES NFR BLD AUTO: 11.9 %
NEUTROPHILS # BLD AUTO: 3.79 X10 (3) UL (ref 1.5–7.7)
NEUTROPHILS # BLD AUTO: 3.79 X10(3) UL (ref 1.5–7.7)
NEUTROPHILS NFR BLD AUTO: 63.3 %
PLATELET # BLD AUTO: 241 10(3)UL (ref 150–450)
RBC # BLD AUTO: 4.21 X10(6)UL
WBC # BLD AUTO: 6 X10(3) UL (ref 4–11)

## 2024-09-18 PROCEDURE — 85025 COMPLETE CBC W/AUTO DIFF WBC: CPT

## 2024-09-18 PROCEDURE — 82746 ASSAY OF FOLIC ACID SERUM: CPT

## 2024-09-18 PROCEDURE — 83550 IRON BINDING TEST: CPT

## 2024-09-18 PROCEDURE — 82728 ASSAY OF FERRITIN: CPT

## 2024-09-18 PROCEDURE — 82607 VITAMIN B-12: CPT

## 2024-09-18 PROCEDURE — 83540 ASSAY OF IRON: CPT

## 2024-09-19 LAB
DEPRECATED HBV CORE AB SER IA-ACNC: 56.4 NG/ML
FOLATE SERPL-MCNC: 11.7 NG/ML (ref 5.4–?)
IRON SATN MFR SERPL: 19 %
IRON SERPL-MCNC: 56 UG/DL
TOTAL IRON BINDING CAPACITY: 299 UG/DL (ref 250–425)
TRANSFERRIN SERPL-MCNC: 230 MG/DL (ref 250–380)
VIT B12 SERPL-MCNC: 197 PG/ML (ref 211–911)

## 2024-09-27 DIAGNOSIS — R79.89 LOW VITAMIN B12 LEVEL: Primary | ICD-10-CM

## 2024-10-12 ENCOUNTER — APPOINTMENT (OUTPATIENT)
Dept: LAB | Age: 74
End: 2024-10-12
Attending: INTERNAL MEDICINE
Payer: MEDICARE

## 2024-10-12 PROCEDURE — 82274 ASSAY TEST FOR BLOOD FECAL: CPT

## 2024-10-21 LAB — HEMOCCULT STL QL: NEGATIVE

## 2024-11-11 ENCOUNTER — TELEPHONE (OUTPATIENT)
Dept: INTERNAL MEDICINE CLINIC | Facility: CLINIC | Age: 74
End: 2024-11-11

## 2024-11-11 DIAGNOSIS — E78.00 PURE HYPERCHOLESTEROLEMIA: Chronic | ICD-10-CM

## 2024-11-11 DIAGNOSIS — M25.572 LEFT ANKLE PAIN, UNSPECIFIED CHRONICITY: Primary | ICD-10-CM

## 2024-11-11 DIAGNOSIS — R60.0 LOCALIZED EDEMA: ICD-10-CM

## 2024-11-11 DIAGNOSIS — E11.9 CONTROLLED TYPE 2 DIABETES MELLITUS WITHOUT COMPLICATION, WITHOUT LONG-TERM CURRENT USE OF INSULIN (HCC): ICD-10-CM

## 2024-11-11 NOTE — TELEPHONE ENCOUNTER
Is a pedal edema and some ankle discomfort.  Patient was evaluated found to have bilateral pitting edema with findings suggestive of osteoarthritis.    Advised to go for x-ray of the left ankle echocardiogram as well as additional blood work as listed in today's YOGI.  Actos was discontinued in view of the pedal edema.    After availability of blood test may consider adding Farxiga if affordable

## 2024-11-18 DIAGNOSIS — M85.88 OSTEOPENIA OF LUMBAR SPINE: ICD-10-CM

## 2024-11-18 DIAGNOSIS — E11.9 TYPE 2 DIABETES MELLITUS WITHOUT COMPLICATION, WITH LONG-TERM CURRENT USE OF INSULIN (HCC): ICD-10-CM

## 2024-11-18 DIAGNOSIS — Z79.4 TYPE 2 DIABETES MELLITUS WITHOUT COMPLICATION, WITH LONG-TERM CURRENT USE OF INSULIN (HCC): ICD-10-CM

## 2024-11-19 RX ORDER — ALENDRONATE SODIUM 70 MG/1
70 TABLET ORAL
Qty: 12 TABLET | Refills: 0 | Status: SHIPPED | OUTPATIENT
Start: 2024-11-19

## 2024-11-19 NOTE — TELEPHONE ENCOUNTER
Protocol failed     LOV: 2/19/24   RTC: no follow ups on file   Labs: 9/18/24  Future Appointments   Date Time Provider Department Center   12/10/2024  1:30 PM [facility] XR RM1 [facility] XRAY [place]   12/10/2024  2:00 PM [facility] CARD STRESS ECHO RM1 [facility] CARD [place]   12/16/2024  7:45 AM [facility] LAB [facility]   1/16/2025 11:00 AM Madiha Atkins DO [id number] [facility] [place]

## 2025-01-16 ENCOUNTER — OFFICE VISIT (OUTPATIENT)
Facility: CLINIC | Age: 75
End: 2025-01-16
Payer: MEDICARE

## 2025-01-16 VITALS
DIASTOLIC BLOOD PRESSURE: 74 MMHG | HEART RATE: 85 BPM | HEIGHT: 64.5 IN | WEIGHT: 142 LBS | SYSTOLIC BLOOD PRESSURE: 128 MMHG | BODY MASS INDEX: 23.95 KG/M2 | OXYGEN SATURATION: 99 %

## 2025-01-16 DIAGNOSIS — M85.88 OSTEOPENIA OF LUMBAR SPINE: Primary | Chronic | ICD-10-CM

## 2025-01-16 PROCEDURE — 99204 OFFICE O/P NEW MOD 45 MIN: CPT | Performed by: STUDENT IN AN ORGANIZED HEALTH CARE EDUCATION/TRAINING PROGRAM

## 2025-01-16 NOTE — H&P
[facility] [place]    Name: Julian Shields    Date: 1/16/2025    Julian Shields is a 74 year old female who presents for evaluation of osteopenia management.     Chief complaint: Osteoporosis (Worsening Dexa Discuss next steps . Last Dexa 8/2024 currently on Alendronate been on medication for almost 4 years)       Subjective:   Initial HPI consult - 1/16/2025    OSTEOPOROSIS RISK FACTORS ASSESSMENT  Postmenopausal Yes, around 49-50   Maternal hx of osteoporosis or hx of parental hip fx:  No   Recent Fracture: No   Previous fracture after the age of 50:  No   Hx of kidney stones No   Frequent falls No   Poor vision Nofollows with glaucoma specialist every 6 months    Decrease in height Yes,   ; half inch    New or Worsening Back Pain No   History of Vit D insufficiency:   Current Vitamin D supplementation: No  2000 units daily since vitamin D    Poor intake of calcium  Daily calcium intake: No  1 tab of calcium carbonate 600 mg    Caffeine intake Yes, 3 cups of black tea daily    Smoking No   Alcohol intake >3/d:  No   Hx of thyroid disease No   Hx of calcium problems or hyperparathyroidism No   Previous treatment for osteoporosis No Fosamax for 4 years    Malabsorption, chronic diarrhea, celiac sprue   No   History of RA  No   History of skeletal radiation No   History of eating disorder No     Intake of medications that cause bone loss:  Long term steroid use: No  Aromatase inhibitor: No  Seizure medications: No  GnRH agonist: No  PPI: No  Lithium: No  SSRI: No  Actos/Avandia: Yes, [id number]    Treatment Contraindications:  Dysphagia:denies  Pain on swallowing: denies    Plans for dental procedures: October 2024, for a cleaning       History/Other:    Allergies, PMH, SocHx and FHx reviewed and updated as appropriate in [facility] on    METFORMIN HCL 1000 MG Oral Tab TAKE 1 TABLET BY MOUTH TWICE  DAILY WITH MEALS 180 tablet 0    ALENDRONATE 70 MG Oral Tab TAKE 1 TABLET BY MOUTH WEEKLY  WITH 8 OZ OF PLAIN  WATER 30  MINUTES BEFORE FIRST FOOD, DRINK OR MEDS. STAY UPRIGHT FOR 30  MINS 12 tablet 0    cyanocobalamin 500 MCG Oral Tab Take 1 tablet (500 mcg total) by mouth daily. 90 tablet 1    Multiple Vitamins-Minerals (MULTI-VITAMIN/MINERALS) Oral Tab Take 1 tablet by mouth daily.      Cholecalciferol (VITAMIN D) 2000 units Oral Tab Take 1 tablet by mouth daily. 90 tablet 3    Netarsudil Dimesylate 0.02 % Ophthalmic Solution Place 1 drop into the left eye nightly.      lisinopril (PRINIVIL,ZESTRIL) 2.5 MG Oral Tab Take 1 tablet (2.5 mg total) by mouth daily. 90 tablet 0    Brinzolamide 1 % Ophthalmic Suspension Place 1 drop into both eyes 2 (two) times daily.      Calcium Carbonate (CALCIUM 600 OR) Take 1 tablet by mouth daily.      Timolol Maleate (TIMOPTIC) 0.5 % Ophthalmic Solution Place 1 drop into both eyes 2 (two) times daily.  2    Atorvastatin Calcium (LIPITOR) 40 MG Oral Tab Take 1 tablet (40 mg total) by mouth daily.      Bimatoprost 0.01 % Ophthalmic Solution Place 1 drop into both eyes nightly.       Allergies[1]  Current Outpatient Medications   Medication Sig Dispense Refill    METFORMIN HCL 1000 MG Oral Tab TAKE 1 TABLET BY MOUTH TWICE  DAILY WITH MEALS 180 tablet 0    ALENDRONATE 70 MG Oral Tab TAKE 1 TABLET BY MOUTH WEEKLY  WITH 8 OZ OF PLAIN WATER 30  MINUTES BEFORE FIRST FOOD, DRINK OR MEDS. STAY UPRIGHT FOR 30  MINS 12 tablet 0    cyanocobalamin 500 MCG Oral Tab Take 1 tablet (500 mcg total) by mouth daily. 90 tablet 1    Multiple Vitamins-Minerals (MULTI-VITAMIN/MINERALS) Oral Tab Take 1 tablet by mouth daily.      Cholecalciferol (VITAMIN D) 2000 units Oral Tab Take 1 tablet by mouth daily. 90 tablet 3    Netarsudil Dimesylate 0.02 % Ophthalmic Solution Place 1 drop into the left eye nightly.      lisinopril (PRINIVIL,ZESTRIL) 2.5 MG Oral Tab Take 1 tablet (2.5 mg total) by mouth daily. 90 tablet 0    Brinzolamide 1 % Ophthalmic Suspension Place 1 drop into both eyes 2 (two) times daily.       Calcium Carbonate (CALCIUM 600 OR) Take 1 tablet by mouth daily.      Timolol Maleate (TIMOPTIC) 0.5 % Ophthalmic Solution Place 1 drop into both eyes 2 (two) times daily.  2    Atorvastatin Calcium (LIPITOR) 40 MG Oral Tab Take 1 tablet (40 mg total) by mouth daily.      Bimatoprost 0.01 % Ophthalmic Solution Place 1 drop into both eyes nightly.       Past Medical History:    Anemia, unspecified    History of cataract surgery    Left Eye    Osteopenia    Pure hypercholesterolemia    Type II or unspecified type diabetes mellitus without mention of complication, not stated as uncontrolled     Family History   Problem Relation Age of Onset    Heart Attack Father     Other (Other) Mother         Unspecified CVA    Diabetes Other         Unspecified sibiling     Hypertension Other         Unspecified sibiling     Cancer Other         unspecified sibling      Social history: Reviewed.      ROS/Exam    REVIEW OF SYSTEMS: Ten point review of systems has been performed and is otherwise negative and/or non-contributory, except as described above.     VITALS  Vitals:    01/16/25 1058   BP: 128/74   Pulse: 85   SpO2: 99%   Weight: 142 lb (64.4 kg)   Height: 5' 4.5\" (1.638 m)       Wt Readings from Last 6 Encounters:   01/16/25 142 lb (64.4 kg)   02/19/24 147 lb (66.7 kg)   09/26/23 144 lb (65.3 kg)   01/24/23 142 lb 12.8 oz (64.8 kg)   08/15/22 142 lb 9.6 oz (64.7 kg)   11/23/21 152 lb 9.6 oz (69.2 kg)       PHYSICAL EXAM  CONSTITUTIONAL:  awake, alert, cooperative, no apparent distress, and appears stated age    PSYCH: normal affect  LUNGS: breathing comfortably  CARDIOVASCULAR:  regular rate   NECK:  no palpable thyroid nodules      Labs/Imaging: Pertinent imaging reviewed.    DXA Scans:  Date L2-L4 BMD T-score % change Mean Femoral Neck BMD T-score % change   8/16/2024 WDR 0.797 -2.3 0.608 -2.2   3/2/2022 WDR 0.779 -2.4 0.657 -1.7   4/11/2019 HOB 0.786 -2.4 0.637 -1.9     2024:     Assessment & Plan:     ICD-10-CM    1.  Osteopenia of lumbar spine  M85.88 PTH, Intact [E]     Renal Function Panel     C-Telopeptide ([employer])     Alkaline Phosphatase, Bone Specific     US THYROID ([id number])          Julian Shields is a pleasant 74 year old female here for evaluation of:    #Osteopenia with high FRAX  Lab Results   Component Value Date    CA 9.7 08/16/2024    CREATSERUM 0.77 08/16/2024    VITD 77.1 08/16/2024   PMHx of osteopenia diagnosed in 2019.  Patient's risk factors include age, poor to moderate calcium intake, menopause, Actos use for around 9 years (4/20/2015 through 11/11/2024)  Current FRAX is 14% for major and 3.8% for hip fracture, making her high fracture  8/16/2024 DEXA with worsening in femoral neck compared to 3/2/2022 DEXA.  Both done at the same location.  Reviewed images and patient was not rotated, T-score likely reliable.    - Discussed pathophysiology of bone loss and clinical significance of DEXA scans with the patient.  - clinical significance of testing discussed to rule out secondary causes.  -Discussed available treatment options for osteoporosis, including bisphosphonates (oral vs. IV), anabolics, Evenity, and Prolia injections, as well as their indications, risks, and benefits, including black box warnings.  Discussed concerns about an increased risk of vertebral fracture after discontinuation of denosumab, the need for indefinite administration of denosumab    -Recent labs stable as above  -Treatment history: Started on Fosamax around 2019.  Endorses compliance and denies history of malabsorption     Plan:  - med changes: Increase calcium intake to 1200 mg daily.  Continue current vitamin D 2000 units daily  -Patient to complete secondary workup along with bone turnover markers.  She meets criteria for therapy since she has osteopenia with high FRAX, hip fracture risk of greater than 3%.  If bone turnover markers elevated while on Fosamax, this may be an absorption issue.  If bone turnover  markers within range, will likely consider switch to Prolia since 2024 DEXA with worsening as above  - lab orders placed and will discuss next steps once all labs result          Return in about 6 weeks (around 2/27/2025).    The above plan was discussed in detail with the patient who verbalized understanding and agreement.      A total of 45 minutes was spent today on obtaining history, reviewing pertinent labs, reviewing relevant pathophysiology with patient, reviewing outside records, evaluating patient, providing multiple treatment options, completing documentation and orders, and communicating with other providers as appropriate.     Madiha Atkins DO  [facility]  1/16/2025     In reviewing this note, please be advised that Dragon Voice Recognition software used to dictate the note may have made errors in recognizing some of the words or phrases.     Note to patient: The 21 Century Cures Act makes medical notes like these available to patients in the interest of transparency. However, be advised this is a medical document. It is intended as peer to peer communication. It is written in medical language and may contain abbreviations or verbiage that are unfamiliar. It may appear blunt or direct. Medical documents are intended to carry relevant information, facts as evident, and the clinical opinion of the practitioner.            [1] No Known Allergies

## 2025-01-16 NOTE — PATIENT INSTRUCTIONS
Visit Summary  Please complete labs and ultrasound prior to follow up appointment     General follow up information:  Please let us know if you require any refills at least 1 week prior to your medication running out. If you do run out of medication, please call our office ASAP to request refills (do not wait until your follow up).  Please call us if you experience any problems with insurance coverage of medication, lab work, or imaging.   Lab results and imaging will typically be reviewed at follow up appointments, or within 3-5 business days of ALL results being in if you do not have an appointment scheduled in the near future. Our office will contact you for any abnormal results requiring more urgent follow up or action.  The on-call pager is for urgent matters only. If you are a type 1 diabetic and run out of insulin after business hours 8AM-4PM, you may call the on-call pager for a refill to a 24 hour pharmacy. If you have adrenal insufficiency and run out of steroids, you may call the on-call pager for a refill to a 24 hours pharmacy. All other refill requests should be requested during business hours.    Return Visit   [x]  Dr. Atkins in 6 weeks    [] Virtual visit  [] No follow up appointment needed  [] Follow up to be scheduled pending      [x]  Fasting/8AM labs  [x]  Central scheduling # for ultrasound/nuclear med/CT/MRI/DXA/IR  []  Provide flyer for:  [] ENT   [] Weight Management  [] Infertility/Reproductive Endocrinology  [] Transgender care  []  Directions to 1st floor lab  [] Collect urine collection jug  [] Collect salivary cortisol tubes  []  Dental clearance form  []  Will need authorization for outside records

## 2025-01-20 ENCOUNTER — TELEPHONE (OUTPATIENT)
Dept: INTERNAL MEDICINE CLINIC | Facility: CLINIC | Age: 75
End: 2025-01-20

## 2025-01-20 DIAGNOSIS — Z12.31 BREAST CANCER SCREENING BY MAMMOGRAM: Primary | ICD-10-CM

## 2025-01-20 NOTE — TELEPHONE ENCOUNTER
Patient called the office requesting order to complete her annual mammogram. Please call patient back when order is ready to be completed.

## 2025-01-29 DIAGNOSIS — M85.88 OSTEOPENIA OF LUMBAR SPINE: ICD-10-CM

## 2025-01-29 RX ORDER — ALENDRONATE SODIUM 70 MG/1
70 TABLET ORAL
Qty: 12 TABLET | Refills: 3 | Status: SHIPPED | OUTPATIENT
Start: 2025-01-29

## 2025-01-29 NOTE — TELEPHONE ENCOUNTER
Name from pharmacy: Alendronate Sodium 70 MG Oral Tablet         Will file in chart as: ALENDRONATE 70 MG Oral Tab    Sig: TAKE 1 TABLET BY MOUTH WEEKLY  WITH 8 OZ OF PLAIN WATER 30  MINUTES BEFORE FIRST FOOD, DRINK OR MEDS. STAY UPRIGHT FOR 30  MINS    Disp: 12 tablet    Refills: 3    Start: 1/29/2025    Class: Normal    Non-formulary For: Osteopenia of lumbar spine    To pharmacy: Requesting 1 year supply    Last ordered: 2 months ago (11/19/2024) by Milan Perez MD    Last refill: 11/19/2024    Rx #: [id number]    Osteoporosis Medication Protocol [id number]/29/2025 03:30 AM   Protocol Details In person appointment or virtual visit in the past 6 mos or appointment in next 3 mos    DEXA scan within past 2 years    CMP within the past 12 months    Calcium level between 8.3 and 10.3    GFR level greater than 35    Medication is active on med list      To be filled at: [address] [phone], [phone]

## 2025-02-03 DIAGNOSIS — Z79.4 TYPE 2 DIABETES MELLITUS WITHOUT COMPLICATION, WITH LONG-TERM CURRENT USE OF INSULIN (HCC): ICD-10-CM

## 2025-02-03 DIAGNOSIS — E11.9 TYPE 2 DIABETES MELLITUS WITHOUT COMPLICATION, WITH LONG-TERM CURRENT USE OF INSULIN (HCC): ICD-10-CM

## 2025-02-03 NOTE — TELEPHONE ENCOUNTER
Name from pharmacy: metFORMIN HCl 1000 MG Oral Tablet         Will file in chart as: METFORMIN HCL 1000 MG Oral Tab    Sig: TAKE 1 TABLET BY MOUTH TWICE  DAILY WITH MEALS    Disp: 180 tablet    Refills: 3    Start: 2/3/2025    Class: Normal    Non-formulary For: Type 2 diabetes mellitus without complication, with long-term current use of insulin (HCC)    To pharmacy: Please send a replace/new response with 90-Day Supply if appropriate to maximize member benefit. Requesting 1 year supply.    Last ordered: 2 months ago (11/19/2024) by Milan Perez MD    Last refill: 11/19/2024    Rx #: [id number]    Diabetes Medication Protocol [id number]/03/2025 03:32 AM   Protocol Details Last A1C < 7.5 and within past 6 months    In person appointment or virtual visit in the past 6 mos or appointment in next 3 mos    Microalbumin procedure in past 12 months or taking ACE/ARB    EGFRCR or GFRNAA > 50    GFR in the past 12 months    Medication is active on med list      To be filled at: [address] [phone], [phone]

## 2025-02-14 ENCOUNTER — HOSPITAL ENCOUNTER (OUTPATIENT)
Dept: ULTRASOUND IMAGING | Age: 75
Discharge: HOME | End: 2025-02-14
Attending: STUDENT IN AN ORGANIZED HEALTH CARE EDUCATION/TRAINING PROGRAM
Payer: MEDICARE

## 2025-02-14 DIAGNOSIS — M85.88 OSTEOPENIA OF LUMBAR SPINE: Chronic | ICD-10-CM

## 2025-02-14 PROCEDURE — 76536 US EXAM OF HEAD AND NECK: CPT | Performed by: STUDENT IN AN ORGANIZED HEALTH CARE EDUCATION/TRAINING PROGRAM

## 2025-02-26 ENCOUNTER — HOSPITAL ENCOUNTER (OUTPATIENT)
Dept: MAMMOGRAPHY | Age: 75
Discharge: HOME | End: 2025-02-26
Attending: INTERNAL MEDICINE
Payer: MEDICARE

## 2025-02-26 ENCOUNTER — LAB ENCOUNTER (OUTPATIENT)
Dept: LAB | Age: 75
End: 2025-02-26
Attending: STUDENT IN AN ORGANIZED HEALTH CARE EDUCATION/TRAINING PROGRAM
Payer: MEDICARE

## 2025-02-26 DIAGNOSIS — Z12.31 BREAST CANCER SCREENING BY MAMMOGRAM: ICD-10-CM

## 2025-02-26 DIAGNOSIS — R60.0 LOCALIZED EDEMA: ICD-10-CM

## 2025-02-26 DIAGNOSIS — E11.9 CONTROLLED TYPE 2 DIABETES MELLITUS WITHOUT COMPLICATION, WITHOUT LONG-TERM CURRENT USE OF INSULIN (HCC): ICD-10-CM

## 2025-02-26 DIAGNOSIS — M85.88 OSTEOPENIA OF LUMBAR SPINE: ICD-10-CM

## 2025-02-26 DIAGNOSIS — R79.89 LOW VITAMIN B12 LEVEL: ICD-10-CM

## 2025-02-26 DIAGNOSIS — M25.572 LEFT ANKLE PAIN, UNSPECIFIED CHRONICITY: ICD-10-CM

## 2025-02-26 LAB
ALBUMIN SERPL-MCNC: 4.3 G/DL (ref 3.2–4.8)
ALBUMIN/GLOB SERPL: 2 {RATIO} (ref 1–2)
ALP LIVER SERPL-CCNC: 85 U/L
ALT SERPL-CCNC: 11 U/L
ANION GAP SERPL CALC-SCNC: 11 MMOL/L (ref 0–18)
AST SERPL-CCNC: 23 U/L (ref ?–34)
BASOPHILS # BLD AUTO: 0.07 X10(3) UL (ref 0–0.2)
BASOPHILS NFR BLD AUTO: 1.1 %
BILIRUB SERPL-MCNC: 0.5 MG/DL (ref 0.2–1.1)
BILIRUB UR QL STRIP.AUTO: NEGATIVE
BUN BLD-MCNC: 12 MG/DL (ref 9–23)
CALCIUM BLD-MCNC: 9.9 MG/DL (ref 8.7–10.6)
CHLORIDE SERPL-SCNC: 101 MMOL/L (ref 98–112)
CLARITY UR REFRACT.AUTO: CLEAR
CO2 SERPL-SCNC: 24 MMOL/L (ref 21–32)
CREAT BLD-MCNC: 0.94 MG/DL
EGFRCR SERPLBLD CKD-EPI 2021: 64 ML/MIN/1.73M2 (ref 60–?)
EOSINOPHIL # BLD AUTO: 0.19 X10(3) UL (ref 0–0.7)
EOSINOPHIL NFR BLD AUTO: 2.9 %
ERYTHROCYTE [DISTWIDTH] IN BLOOD BY AUTOMATED COUNT: 13.9 %
EST. AVERAGE GLUCOSE BLD GHB EST-MCNC: 128 MG/DL (ref 68–126)
FASTING STATUS PATIENT QL REPORTED: YES
GLOBULIN PLAS-MCNC: 2.1 G/DL (ref 2–3.5)
GLUCOSE BLD-MCNC: 93 MG/DL (ref 70–99)
GLUCOSE UR STRIP.AUTO-MCNC: NORMAL MG/DL
HBA1C MFR BLD: 6.1 % (ref ?–5.7)
HCT VFR BLD AUTO: 39.8 %
HGB BLD-MCNC: 12.8 G/DL
HM MAMMOGRAPHY BILATERAL: NORMAL
IMM GRANULOCYTES # BLD AUTO: 0.02 X10(3) UL (ref 0–1)
IMM GRANULOCYTES NFR BLD: 0.3 %
KETONES UR STRIP.AUTO-MCNC: NEGATIVE MG/DL
LEUKOCYTE ESTERASE UR QL STRIP.AUTO: NEGATIVE
LYMPHOCYTES # BLD AUTO: 1.43 X10(3) UL (ref 1–4)
LYMPHOCYTES NFR BLD AUTO: 22 %
MCH RBC QN AUTO: 27.9 PG (ref 26–34)
MCHC RBC AUTO-ENTMCNC: 32.2 G/DL (ref 31–37)
MCV RBC AUTO: 86.9 FL
MONOCYTES # BLD AUTO: 0.55 X10(3) UL (ref 0.1–1)
MONOCYTES NFR BLD AUTO: 8.5 %
NEUTROPHILS # BLD AUTO: 4.23 X10 (3) UL (ref 1.5–7.7)
NEUTROPHILS # BLD AUTO: 4.23 X10(3) UL (ref 1.5–7.7)
NEUTROPHILS NFR BLD AUTO: 65.2 %
NITRITE UR QL STRIP.AUTO: NEGATIVE
OSMOLALITY SERPL CALC.SUM OF ELEC: 281 MOSM/KG (ref 275–295)
PH UR STRIP.AUTO: 8 [PH] (ref 5–8)
PHOSPHATE SERPL-MCNC: 1.1 MG/DL (ref 2.4–5.1)
PLATELET # BLD AUTO: 273 10(3)UL (ref 150–450)
POTASSIUM SERPL-SCNC: 4.5 MMOL/L (ref 3.5–5.1)
PROT SERPL-MCNC: 6.4 G/DL (ref 5.7–8.2)
PROT UR STRIP.AUTO-MCNC: NEGATIVE MG/DL
PTH-INTACT SERPL-MCNC: 8.9 PG/ML (ref 18.5–88)
RBC # BLD AUTO: 4.58 X10(6)UL
RBC UR QL AUTO: NEGATIVE
SODIUM SERPL-SCNC: 136 MMOL/L (ref 136–145)
SP GR UR STRIP.AUTO: 1.01 (ref 1–1.03)
TSI SER-ACNC: 3.2 UIU/ML (ref 0.55–4.78)
URATE SERPL-MCNC: 5.6 MG/DL
UROBILINOGEN UR STRIP.AUTO-MCNC: NORMAL MG/DL
VIT B12 SERPL-MCNC: 448 PG/ML (ref 211–911)
WBC # BLD AUTO: 6.5 X10(3) UL (ref 4–11)

## 2025-02-26 PROCEDURE — 85025 COMPLETE CBC W/AUTO DIFF WBC: CPT

## 2025-02-26 PROCEDURE — 80053 COMPREHEN METABOLIC PANEL: CPT

## 2025-02-26 PROCEDURE — 83036 HEMOGLOBIN GLYCOSYLATED A1C: CPT

## 2025-02-26 PROCEDURE — 81003 URINALYSIS AUTO W/O SCOPE: CPT

## 2025-02-26 PROCEDURE — 84080 ASSAY ALKALINE PHOSPHATASES: CPT

## 2025-02-26 PROCEDURE — 36415 COLL VENOUS BLD VENIPUNCTURE: CPT

## 2025-02-26 PROCEDURE — 84100 ASSAY OF PHOSPHORUS: CPT

## 2025-02-26 PROCEDURE — 84550 ASSAY OF BLOOD/URIC ACID: CPT

## 2025-02-26 PROCEDURE — 77063 BREAST TOMOSYNTHESIS BI: CPT | Performed by: INTERNAL MEDICINE

## 2025-02-26 PROCEDURE — 84443 ASSAY THYROID STIM HORMONE: CPT

## 2025-02-26 PROCEDURE — 77067 SCR MAMMO BI INCL CAD: CPT | Performed by: INTERNAL MEDICINE

## 2025-02-26 PROCEDURE — 83970 ASSAY OF PARATHORMONE: CPT

## 2025-02-26 PROCEDURE — 82523 COLLAGEN CROSSLINKS: CPT

## 2025-02-26 PROCEDURE — 82607 VITAMIN B-12: CPT

## 2025-03-03 LAB
ALKALINE PHOSPHATASE BONE SPECIFIC: 21.1 UG/L
C-TELOPEPTIDE: 600 PG/ML

## 2025-03-25 ENCOUNTER — OFFICE VISIT (OUTPATIENT)
Facility: CLINIC | Age: 75
End: 2025-03-25
Payer: MEDICARE

## 2025-03-25 VITALS
OXYGEN SATURATION: 98 % | SYSTOLIC BLOOD PRESSURE: 112 MMHG | HEIGHT: 64.5 IN | BODY MASS INDEX: 24 KG/M2 | HEART RATE: 62 BPM | DIASTOLIC BLOOD PRESSURE: 64 MMHG

## 2025-03-25 DIAGNOSIS — M85.88 OSTEOPENIA OF LUMBAR SPINE: Primary | ICD-10-CM

## 2025-03-25 DIAGNOSIS — E04.1 THYROID NODULE: ICD-10-CM

## 2025-03-25 PROCEDURE — 99214 OFFICE O/P EST MOD 30 MIN: CPT | Performed by: STUDENT IN AN ORGANIZED HEALTH CARE EDUCATION/TRAINING PROGRAM

## 2025-03-25 NOTE — PATIENT INSTRUCTIONS
Visit Summary  Continue calcium and vitamin D  We will give you a dental clearance form today and I will wait to hear from you regarding next steps (stay on fosamax or switch to Prolia)  If planning on prolia, please complete your labs within 30 days of your appointment for your injection     General follow up information:  Please let us know if you require any refills at least 1 week prior to your medication running out. If you do run out of medication, please call our office ASAP to request refills (do not wait until your follow up).  Please call us if you experience any problems with insurance coverage of medication, lab work, or imaging.   Lab results and imaging will typically be reviewed at follow up appointments, or within 3-5 business days of ALL results being in if you do not have an appointment scheduled in the near future. Our office will contact you for any abnormal results requiring more urgent follow up or action.  The on-call pager is for urgent matters only. If you are a type 1 diabetic and run out of insulin after business hours 8AM-4PM, you may call the on-call pager for a refill to a 24 hour pharmacy. If you have adrenal insufficiency and run out of steroids, you may call the on-call pager for a refill to a 24 hours pharmacy. All other refill requests should be requested during business hours.    Return Visit   [x] Dr. Atkins in 8 months   [] Virtual visit  [] No follow up appointment needed  [] Follow up to be scheduled pending      []  Fasting/8AM labs  []  Central scheduling # for ultrasound/nuclear med/CT/MRI/DXA/IR  []  Provide flyer for:  [] ENT   [] Weight Management  [] Infertility/Reproductive Endocrinology  [] Transgender care  []  Directions to 1st floor lab  [] Collect urine collection jug  [] Collect salivary cortisol tubes  [x]  Dental clearance form  []  Will need authorization for outside records

## 2025-03-25 NOTE — PROGRESS NOTES
[facility] Note    Name: Julian Shields    Date: 3/25/2025    Julian Shields is a 74 year old female who presents for evaluation of osteopenia management.     Chief complaint: Follow - Up (Osteopenia 6mo Lab Followup ) and Ultrasound (Consult Thyroid Ultrasound 2025)       Subjective:   Initial HPI consult - 2025    OSTEOPOROSIS RISK FACTORS ASSESSMENT  Postmenopausal Yes, around 49-50   Maternal hx of osteoporosis or hx of parental hip fx:  No   Recent Fracture: No   Previous fracture after the age of 50:  No   Hx of kidney stones No   Frequent falls No   Poor vision Nofollows with glaucoma specialist every 6 months    Decrease in height Yes,   ; half inch    New or Worsening Back Pain No   History of Vit D insufficiency:   Current Vitamin D supplementation: No  2000 units daily since vitamin D    Poor intake of calcium  Daily calcium intake: No  1 tab of calcium carbonate 600 mg    Caffeine intake Yes, 3 cups of black tea daily    Smoking No   Alcohol intake >3/d:  No   Hx of thyroid disease No   Hx of calcium problems or hyperparathyroidism No   Previous treatment for osteoporosis No Fosamax for 4 years    Malabsorption, chronic diarrhea, celiac sprue   No   History of RA  No   History of skeletal radiation No   History of eating disorder No     Intake of medications that cause bone loss:  Long term steroid use: No  Aromatase inhibitor: No  Seizure medications: No  GnRH agonist: No  PPI: No  Lithium: No  SSRI: No  Actos/Avandia: Yes, [id number]    Treatment Contraindications:  Dysphagia:denies  Pain on swallowing: denies    Plans for dental procedures: 2024, for a cleaning     Interval Hx 25  Labs: 2025 A1c 6.1, PTH 8.9, , BSAP 21.1, phosphorus 1.1, TSH 3.2, total calcium 9.9, creatinine 0.94  Imagin2025 Thyroid US 7 mm right upper pole nodule and 1.7 cm left midpole TR 3 nodule  Dietary calcium intake 250-450  No falls or fractures since LOV   Continues on  Fosamax weekly  Continues on calcium 600mg and vitamin D supplements      History/Other:    Allergies, PMH, SocHx and FHx reviewed and updated as appropriate in Epic on    METFORMIN HCL 1000 MG Oral Tab TAKE 1 TABLET BY MOUTH TWICE  DAILY WITH MEALS 180 tablet 3    ALENDRONATE 70 MG Oral Tab TAKE 1 TABLET BY MOUTH WEEKLY  WITH 8 OZ OF PLAIN WATER 30  MINUTES BEFORE FIRST FOOD, DRINK OR MEDS. STAY UPRIGHT FOR 30  MINS 12 tablet 3    Multiple Vitamins-Minerals (MULTI-VITAMIN/MINERALS) Oral Tab Take 1 tablet by mouth daily.      Cholecalciferol (VITAMIN D) 2000 units Oral Tab Take 1 tablet by mouth daily. 90 tablet 3    Netarsudil Dimesylate 0.02 % Ophthalmic Solution Place 1 drop into the left eye nightly.      lisinopril (PRINIVIL,ZESTRIL) 2.5 MG Oral Tab Take 1 tablet (2.5 mg total) by mouth daily. 90 tablet 0    Brinzolamide 1 % Ophthalmic Suspension Place 1 drop into both eyes 2 (two) times daily.      Calcium Carbonate (CALCIUM 600 OR) Take 1 tablet by mouth daily.      Timolol Maleate (TIMOPTIC) 0.5 % Ophthalmic Solution Place 1 drop into both eyes 2 (two) times daily.  2    Atorvastatin Calcium (LIPITOR) 40 MG Oral Tab Take 1 tablet (40 mg total) by mouth daily.      Bimatoprost 0.01 % Ophthalmic Solution Place 1 drop into both eyes nightly.       Allergies[1]  Current Outpatient Medications   Medication Sig Dispense Refill    METFORMIN HCL 1000 MG Oral Tab TAKE 1 TABLET BY MOUTH TWICE  DAILY WITH MEALS 180 tablet 3    ALENDRONATE 70 MG Oral Tab TAKE 1 TABLET BY MOUTH WEEKLY  WITH 8 OZ OF PLAIN WATER 30  MINUTES BEFORE FIRST FOOD, DRINK OR MEDS. STAY UPRIGHT FOR 30  MINS 12 tablet 3    Multiple Vitamins-Minerals (MULTI-VITAMIN/MINERALS) Oral Tab Take 1 tablet by mouth daily.      Cholecalciferol (VITAMIN D) 2000 units Oral Tab Take 1 tablet by mouth daily. 90 tablet 3    Netarsudil Dimesylate 0.02 % Ophthalmic Solution Place 1 drop into the left eye nightly.      lisinopril (PRINIVIL,ZESTRIL) 2.5 MG Oral Tab  Take 1 tablet (2.5 mg total) by mouth daily. 90 tablet 0    Brinzolamide 1 % Ophthalmic Suspension Place 1 drop into both eyes 2 (two) times daily.      Calcium Carbonate (CALCIUM 600 OR) Take 1 tablet by mouth daily.      Timolol Maleate (TIMOPTIC) 0.5 % Ophthalmic Solution Place 1 drop into both eyes 2 (two) times daily.  2    Atorvastatin Calcium (LIPITOR) 40 MG Oral Tab Take 1 tablet (40 mg total) by mouth daily.      Bimatoprost 0.01 % Ophthalmic Solution Place 1 drop into both eyes nightly.       Past Medical History:    Anemia, unspecified    History of cataract surgery    Left Eye    Osteopenia    Pure hypercholesterolemia    Type II or unspecified type diabetes mellitus without mention of complication, not stated as uncontrolled     Family History   Problem Relation Age of Onset    Heart Attack Father     Other (Other) Mother         Unspecified CVA    Diabetes Other         Unspecified sibiling     Hypertension Other         Unspecified sibiling     Cancer Other         unspecified sibling      Social history: Reviewed.      ROS/Exam    REVIEW OF SYSTEMS: Ten point review of systems has been performed and is otherwise negative and/or non-contributory, except as described above.     VITALS  Vitals:    03/25/25 1330   BP: 112/64   Pulse: 62   SpO2: 98%   Height: 5' 4.5\" (1.638 m)       Wt Readings from Last 6 Encounters:   01/16/25 142 lb (64.4 kg)   02/19/24 147 lb (66.7 kg)   09/26/23 144 lb (65.3 kg)   01/24/23 142 lb 12.8 oz (64.8 kg)   08/15/22 142 lb 9.6 oz (64.7 kg)   11/23/21 152 lb 9.6 oz (69.2 kg)       PHYSICAL EXAM  CONSTITUTIONAL:  awake, alert, cooperative, no apparent distress, and appears stated age    PSYCH: normal affect  LUNGS: breathing comfortably  CARDIOVASCULAR:  regular rate   NECK:  no thyromegaly    Labs/Imaging: Pertinent imaging reviewed.    DXA Scans:  Date L2-L4 BMD T-score % change Mean Femoral Neck BMD T-score % change   8/16/2024 WDR 0.797 -2.3 0.608 -2.2   3/2/2022 WDR 0.779  -2.4 0.657 -1.7   4/11/2019 HOB 0.786 -2.4 0.637 -1.9     2024:     Assessment & Plan:   No diagnosis found.      Julian Shields is a pleasant 74 year old female here for evaluation of:    #Osteopenia with high FRAX  Lab Results   Component Value Date    PTH 8.9 (L) 02/26/2025    CA 9.9 02/26/2025    CREATSERUM 0.94 02/26/2025    PHOS 1.1 (L) 02/26/2025    VITD 77.1 08/16/2024   PMHx of osteopenia diagnosed in 2019.  Patient's risk factors include age, poor to moderate calcium intake, menopause, Actos use for around 9 years (4/20/2015 through 11/11/2024)  Current FRAX is 14% for major and 3.8% for hip fracture, making her high fracture  8/16/2024 DEXA with worsening in femoral neck compared to 3/2/2022 DEXA.  Both done at the same location.  Reviewed images and patient was not rotated, T-score likely reliable.    - Discussed pathophysiology of bone loss and clinical significance of DEXA scans with the patient.  - clinical significance of testing discussed to rule out secondary causes.  -Discussed available treatment options for osteoporosis, including bisphosphonates (oral vs. IV), anabolics, Evenity, and Prolia injections, as well as their indications, risks, and benefits, including black box warnings.  Discussed concerns about an increased risk of vertebral fracture after discontinuation of denosumab, the need for indefinite administration of denosumab    -Recent labs stable as above  -Treatment history: Started on Fosamax around 2019.  Endorses compliance and denies history of malabsorption     Plan:  - med changes: Increase calcium intake to 1200 mg daily.  Continue current vitamin D 2000 units daily  -We reviewed based on secondary workup patient has been on Fosamax for over 5 years without improvement in bone density and with upper limit bone turnover marker.  We discussed multiple options including switching to Prolia since 2024 DEXA with worsening as above.  Dental clearance given.  Patient will reach out  with final decision and I will place labs at that time       #Thyroid nodule  Patient with history of thyroid nodule since 2011.    3/29/2011 ultrasound thyroid with 8 mm left lower pole, 1.5 cm left lower pole, 6 mm left lower lobe   Patient has had multiple ultrasounds with 12/3/2018 ultrasound showing 9 mm right inferior pole nodule, 4 mm right inferior pole nodule, 4 mm right superior pole nodule, 1.6 cm left inferior pole nodule  2/14/2025 thyroid ultrasound with 2 nodules, both stable in size.  Largest nodule 1.7 cm TR 3    Plan:   -Since nodule size has remained stable from [id number], can consider repeat thyroid ultrasound in 6-7 years   -Reviewed compressive symptoms and discussed we can obtain ultrasound sooner if patient starts developing these    Return in about 8 months (around 11/25/2025).    The above plan was discussed in detail with the patient who verbalized understanding and agreement.      Madiha Atkins,   [facility]  3/25/2025     In reviewing this note, please be advised that Dragon Voice Recognition software used to dictate the note may have made errors in recognizing some of the words or phrases.     Note to patient: The 21 Century Cures Act makes medical notes like these available to patients in the interest of transparency. However, be advised this is a medical document. It is intended as peer to peer communication. It is written in medical language and may contain abbreviations or verbiage that are unfamiliar. It may appear blunt or direct. Medical documents are intended to carry relevant information, facts as evident, and the clinical opinion of the practitioner.            [1] No Known Allergies

## 2025-04-11 LAB — HM DILATED EYE EXAM: NORMAL

## 2025-04-22 ENCOUNTER — OFFICE VISIT (OUTPATIENT)
Dept: INTERNAL MEDICINE CLINIC | Facility: CLINIC | Age: 75
End: 2025-04-22
Payer: MEDICARE

## 2025-04-22 VITALS
BODY MASS INDEX: 21.93 KG/M2 | TEMPERATURE: 98 F | HEART RATE: 90 BPM | WEIGHT: 130 LBS | DIASTOLIC BLOOD PRESSURE: 90 MMHG | SYSTOLIC BLOOD PRESSURE: 136 MMHG | OXYGEN SATURATION: 96 % | HEIGHT: 64.5 IN | RESPIRATION RATE: 16 BRPM

## 2025-04-22 DIAGNOSIS — R63.4 WEIGHT LOSS: ICD-10-CM

## 2025-04-22 DIAGNOSIS — E78.00 PURE HYPERCHOLESTEROLEMIA: Chronic | ICD-10-CM

## 2025-04-22 DIAGNOSIS — Z00.00 ROUTINE GENERAL MEDICAL EXAMINATION AT A HEALTH CARE FACILITY: Primary | ICD-10-CM

## 2025-04-22 DIAGNOSIS — M85.88 OSTEOPENIA OF LUMBAR SPINE: Chronic | ICD-10-CM

## 2025-04-22 PROBLEM — E08.9: Chronic | Status: ACTIVE | Noted: 2024-02-19

## 2025-04-22 PROCEDURE — G0439 PPPS, SUBSEQ VISIT: HCPCS | Performed by: INTERNAL MEDICINE

## 2025-04-22 RX ORDER — DORZOLAMIDE HCL 20 MG/ML
1 SOLUTION/ DROPS OPHTHALMIC 3 TIMES DAILY
COMMUNITY
Start: 2025-02-11

## 2025-04-22 RX ORDER — OSELTAMIVIR PHOSPHATE 75 MG/1
75 CAPSULE ORAL DAILY
COMMUNITY
Start: 2025-01-21 | End: 2025-04-22 | Stop reason: ALTCHOICE

## 2025-04-22 NOTE — PROGRESS NOTES
Julian Shields  9/10/1950 is a 74 year old female.    Chief Complaint   Patient presents with    Annual     MAW       HPI:     Current Outpatient Medications   Medication Sig Dispense Refill    dorzolamide 2 % Ophthalmic Solution Place 1 drop into both eyes 3 (three) times daily.      METFORMIN HCL 1000 MG Oral Tab TAKE 1 TABLET BY MOUTH TWICE  DAILY WITH MEALS 180 tablet 3    ALENDRONATE 70 MG Oral Tab TAKE 1 TABLET BY MOUTH WEEKLY  WITH 8 OZ OF PLAIN WATER 30  MINUTES BEFORE FIRST FOOD, DRINK OR MEDS. STAY UPRIGHT FOR 30  MINS 12 tablet 3    Multiple Vitamins-Minerals (MULTI-VITAMIN/MINERALS) Oral Tab Take 1 tablet by mouth daily.      Cholecalciferol (VITAMIN D) 2000 units Oral Tab Take 1 tablet by mouth daily. 90 tablet 3    Netarsudil Dimesylate 0.02 % Ophthalmic Solution Place 1 drop into the left eye nightly.      lisinopril (PRINIVIL,ZESTRIL) 2.5 MG Oral Tab Take 1 tablet (2.5 mg total) by mouth daily. 90 tablet 0    Brinzolamide 1 % Ophthalmic Suspension Place 1 drop into both eyes 2 (two) times daily.      Calcium Carbonate (CALCIUM 600 OR) Take 1 tablet by mouth daily.      Timolol Maleate (TIMOPTIC) 0.5 % Ophthalmic Solution Place 1 drop into both eyes 2 (two) times daily.  2    Atorvastatin Calcium (LIPITOR) 40 MG Oral Tab Take 1 tablet (40 mg total) by mouth daily.      Bimatoprost 0.01 % Ophthalmic Solution Place 1 drop into both eyes nightly.        No Known Allergies   Past Medical History:    Anemia, unspecified    History of cataract surgery    Left Eye    Osteopenia    Pure hypercholesterolemia    Type II or unspecified type diabetes mellitus without mention of complication, not stated as uncontrolled      Social History:  Social History     Socioeconomic History    Marital status:    Tobacco Use    Smoking status: Never    Smokeless tobacco: Never   Vaping Use    Vaping status: Never Used   Substance and Sexual Activity    Alcohol use: No    Drug use: No   Other Topics Concern     Caffeine Concern No    Exercise No    Seat Belt No    Special Diet No    Stress Concern No    Weight Concern No     Social Drivers of Health     Food Insecurity: No Food Insecurity (4/22/2025)    NCSS - Food Insecurity     Worried About Running Out of Food in the Last Year: No     Ran Out of Food in the Last Year: No   Transportation Needs: No Transportation Needs (4/22/2025)    NCSS - Transportation     Lack of Transportation: No   Housing Stability: Not At Risk (4/22/2025)    NCSS - Housing/Utilities     Has Housing: Yes     Worried About Losing Housing: No     Unable to Get Utilities: No          REVIEW OF SYSTEMS:   General/Constitutional:   General able to do usual activities, good exercise tolerance, good general state of health, no fatigue, no fever, no weakness, no weight loss or gain .   HEENT/Neck:   Head no headache, no dizziness, no lightheadedness. Eyes patient is legally blind secondary to glaucoma does go to  Dr GUSTAVO Owen  no redness, no drainage. Ears no earaches, no fullness, normal hearing, no tinnitus. Nose and Sinuses no colds, no discharge, no hay fever, no sinus trouble. Mouth and Pharynx no sore throats, no hoarseness. Neck no lumps, no goiter, no neck stiffness or pain.   Endocrine:   Diabetes 1999-stable   Thyroid disorder nodule   patient does follow-up with endocrinologist dr Atkins probably prolia   Respiratory:   Patient denies chest pain, cough, POWERS (dyspnea on exertion), chest congestion, blood-tinged sputum,wheezing. Breathing normal pattern .   Cardiovascular:   Patient denies chest pain, shortness of breath/rheumatic fever/murmur/dizzziness cholesterol normal. Leg edema none. Orthopnea none. Palpitations none. PND (paroxsymal nocturnal dyspnea) none.  Last cardiac stress possible Lexiscan in 2016-never went -patient reluctant to get a cardiac opinion.  Gastrointestinal:   Patient denies abdominal pain, blood in stool, constipation, diarrhea, difficulty swallowing, change in stools,  nausea, vomiting    has had some weight loss in the last few months which could be because of her social situation no heart burn noted.- Colonoscopy 11 years ago-  Hematology:   Patient denies abnormal bleeding, easy bleeding, easy bruising. Enlarged lymph nodes none.   Women Only:   Patient denies breast pain.axillary nodes . Breast lumps or discharge none. Mammogram up-to-date yes   Genitourinary:   Patient denies difficulty urinating, frequent urination, hematuria. Dysuria none. Nocturia None. Urinary frequency no. Urinary incontinence no.   Musculoskeletal:   Arthritis No. Back pain no. Joint pain no. Joint stiffness no. Muscle weakness none.   Peripheral Vascular:   General no varicosities, no claudication.   Dermatologic:   Rash no.   Neurologic:   Patient denies dizziness, fainting, loss of consciousness, weakness. Memory loss none. Tingling/numbness none. Trouble with balance none.   Psychiatric: none            EXAM:   /90 (BP Location: Right arm, Patient Position: Sitting, Cuff Size: adult)   Pulse 90   Temp 98.3 °F (36.8 °C) (Temporal)   Resp 16   Ht 5' 4.5\" (1.638 m)   Wt 130 lb (59 kg)   SpO2 96%   BMI 21.97 kg/m²   GENERAL:   Build: normal .   General Appearance: alert and oriented, pleasant.   HEENT:   Ear canals: normal.   EOM: within normal limit-conjunctiva normal.   Head: normocephalic.   Nasal septum: midline.   Nose: unremarkable.   Oral cavity: normal.   Pupils: normal, bilaterally .   Sclera: normal.  Entire mildly injected  Turbinates: normal.   NECK:   Carotid bruit: none.   Cervical lymph nodes: unremarkable.   JVD: none.   Range of Motion: normal.   Thyroid: unremarkable.   HEART:   Clicks: absent .   Heart sounds: normal.   Murmurs: none.   Rhythm: regular.   BREASTS:   Per gyn   LUNGS:   Auscultation: clear .   Chest Shape: normal .   Percussion: normal.   Rales: no .   Respiratory effort: normal .   Rhonchi: no.   Wheezes: no.   ABDOMEN:   General: normal.   Hernia: absent.    Inguinal nodes: none.   Liver, Spleen: non-enlarged.   Rebound tenderness: absent.   Tenderness: absent .   EXTREMITIES:   Clubbing: none.   Cyanosis: absent .   Edema: none.   Pulses: present, bilateral.   Tremors: no.   Varicose veins: not present.   MUSCULOSKELETAL:   Cervical spines: normal.   L-S spines: normal.   Lower extremity joints: normal.  Mild OA knees  Upper extremity joints: normal.   NEUROLOGICAL:   Babinski: negative/all reflexes are normal.   Cerebellar Testing grossly/intact: yes.   Cranial Nerves: CN's II-XII grossly intact.   Gait: normal.   Mental Status: Alert & oriented x 3.   Motor: power-normal/tone -normal/co-ordination normal/wasting -none/involuntary movements -none.   Reflexes: normal.   Sensory: normal sensation to all modalities.   LYMPHATICS:   Groin: no adenopathy .   Inguinal: no adenopathy.   Supraclavicular: none.   DERMATOLOGY:   Rash: no.      ASSESSMENT AND PLAN:   Julian was seen today for annual.    Diagnoses and all orders for this visit:    Routine general medical examination at a health care facility  -     Gastro Referral - In Network  -     Lipid Panel; Future  -     Hemoglobin A1C; Future    Pure hypercholesterolemia    Osteopenia of lumbar spine    Weight loss  -     CT ABDOMEN+PELVIS(CONTRAST ONLY)(CPT=74177); Future  -     XR CHEST PA + LAT CHEST (CPT=71046); Future  -     Sed Rate, Raúl (Automated); Future      Labs discussed with patient    Patient Instructions   Patient will establish a PCP out of [facility]     The patient indicates understanding of these issues and agrees to the plan.  The patient is asked to No follow-ups on file.  .    Milan Perez MD

## 2025-05-06 ENCOUNTER — PATIENT MESSAGE (OUTPATIENT)
Facility: CLINIC | Age: 75
End: 2025-05-06

## 2025-05-06 DIAGNOSIS — M85.88 OSTEOPENIA OF LUMBAR SPINE: Primary | ICD-10-CM

## 2025-05-06 NOTE — TELEPHONE ENCOUNTER
Last office note: We will give you a dental clearance form today and I will wait to hear from you regarding next steps (stay on fosamax or switch to Prolia)  If planning on prolia, please complete your labs within 30 days of your appointment for your injection     Labs: pended for review.    Dental clearance: printed from [employer].     [employer]: patient entered, awaiting packet

## 2025-05-07 NOTE — TELEPHONE ENCOUNTER
Summary of benefits [employer]   No prior authorization needed.   257$ deductible met of 257$ required.   Telephoned patient to have patient complete lab work to schedule for Prolia injection. Left a voicemail for patient to call back.   Dental clearance in RN brown folder.       Patient telephoned in updated on labs to be done to proceed with Prolia injection.

## 2025-05-07 NOTE — TELEPHONE ENCOUNTER
Fax From: [employer]     Summary of Benefits     Prolia Injection  Benefits     Place in [address]

## 2025-05-16 ENCOUNTER — TELEPHONE (OUTPATIENT)
Dept: INTERNAL MEDICINE CLINIC | Facility: CLINIC | Age: 75
End: 2025-05-16

## 2025-05-16 NOTE — TELEPHONE ENCOUNTER
Pt refused to give any details regarding the nature of her questions. Pt simply requested a call back from clinical ( nurse).    Also relayed she is related to Dr. Perez and knows he is retiring but didn't want to bother him on his personal cell phone.

## 2025-05-16 NOTE — TELEPHONE ENCOUNTER
Spoke with patient. Patient states she is going to see another physician, but sometime in June or July. Pt was requesting what labs she needs to complete and if she could have a refill on Lisinopril. This RN advised patient that current PCP has retired on 5/15/25 and will follow-up with a covering provider to see if they will be OK with sending in refill. Will follow-up. Pt v/u.

## 2025-05-28 RX ORDER — LISINOPRIL 2.5 MG/1
2.5 TABLET ORAL DAILY
Qty: 90 TABLET | Refills: 0 | Status: SHIPPED | OUTPATIENT
Start: 2025-05-28

## 2025-05-28 NOTE — TELEPHONE ENCOUNTER
Protocol failed    LOV: 4/22/25  RTC: none noted  Filled: 5/23/25 #90   Labs: 2/26/25   No future appointments.

## 2025-07-17 ENCOUNTER — APPOINTMENT (OUTPATIENT)
Dept: INTERNAL MEDICINE | Age: 75
End: 2025-07-17

## 2025-07-17 VITALS
OXYGEN SATURATION: 100 % | DIASTOLIC BLOOD PRESSURE: 82 MMHG | SYSTOLIC BLOOD PRESSURE: 128 MMHG | HEART RATE: 60 BPM | BODY MASS INDEX: 22.33 KG/M2 | WEIGHT: 134 LBS | RESPIRATION RATE: 16 BRPM | HEIGHT: 65 IN | TEMPERATURE: 97.9 F

## 2025-07-17 DIAGNOSIS — E11.9 CONTROLLED TYPE 2 DIABETES MELLITUS WITHOUT COMPLICATION, WITHOUT LONG-TERM CURRENT USE OF INSULIN (CMD): ICD-10-CM

## 2025-07-17 DIAGNOSIS — M85.89 OSTEOPENIA OF MULTIPLE SITES: ICD-10-CM

## 2025-07-17 DIAGNOSIS — R63.4 UNINTENTIONAL WEIGHT LOSS: Primary | ICD-10-CM

## 2025-07-17 DIAGNOSIS — I10 BENIGN ESSENTIAL HYPERTENSION: ICD-10-CM

## 2025-07-17 DIAGNOSIS — E11.69 HYPERLIPIDEMIA ASSOCIATED WITH TYPE 2 DIABETES MELLITUS  (CMD): ICD-10-CM

## 2025-07-17 DIAGNOSIS — Z12.11 COLON CANCER SCREENING: ICD-10-CM

## 2025-07-17 DIAGNOSIS — E78.5 HYPERLIPIDEMIA ASSOCIATED WITH TYPE 2 DIABETES MELLITUS  (CMD): ICD-10-CM

## 2025-07-17 RX ORDER — ATORVASTATIN CALCIUM 40 MG/1
40 TABLET, FILM COATED ORAL DAILY
COMMUNITY

## 2025-07-17 RX ORDER — TIMOLOL HEMIHYDRATE 5 MG/ML
1 SOLUTION/ DROPS OPHTHALMIC
COMMUNITY

## 2025-07-17 RX ORDER — ALENDRONATE SODIUM 70 MG/1
70 TABLET ORAL
COMMUNITY
Start: 2025-01-29

## 2025-07-17 RX ORDER — LISINOPRIL 2.5 MG/1
2.5 TABLET ORAL
COMMUNITY
Start: 2025-05-28

## 2025-07-17 RX ORDER — CYANOCOBALAMIN (VITAMIN B-12) 500 MCG
1 TABLET ORAL DAILY
COMMUNITY

## 2025-07-17 RX ORDER — PHENOL 1.4 %
1 AEROSOL, SPRAY (ML) MUCOUS MEMBRANE DAILY
COMMUNITY

## 2025-07-17 RX ORDER — DORZOLAMIDE HCL 20 MG/ML
1 SOLUTION/ DROPS OPHTHALMIC
COMMUNITY
Start: 2025-02-11

## 2025-07-17 ASSESSMENT — PATIENT HEALTH QUESTIONNAIRE - PHQ9
CLINICAL INTERPRETATION OF PHQ2 SCORE: NO FURTHER SCREENING NEEDED
2. FEELING DOWN, DEPRESSED OR HOPELESS: NOT AT ALL
1. LITTLE INTEREST OR PLEASURE IN DOING THINGS: NOT AT ALL
SUM OF ALL RESPONSES TO PHQ9 QUESTIONS 1 AND 2: 0
SUM OF ALL RESPONSES TO PHQ9 QUESTIONS 1 AND 2: 0

## 2025-07-17 ASSESSMENT — ENCOUNTER SYMPTOMS
EYE PAIN: 0
UNEXPECTED WEIGHT CHANGE: 0
SORE THROAT: 0
COUGH: 0
BACK PAIN: 0
BLOOD IN STOOL: 0
CONSTIPATION: 0
FATIGUE: 0
ABDOMINAL DISTENTION: 0
WEAKNESS: 0
COLOR CHANGE: 0
ABDOMINAL PAIN: 0
TROUBLE SWALLOWING: 0
APPETITE CHANGE: 0
SINUS PRESSURE: 0
WHEEZING: 0
LIGHT-HEADEDNESS: 0
DIZZINESS: 0
EYE REDNESS: 0
NAUSEA: 0
NERVOUS/ANXIOUS: 0
SHORTNESS OF BREATH: 0
FEVER: 0
DIARRHEA: 0
HEADACHES: 0
NUMBNESS: 0
EYE DISCHARGE: 0
CHEST TIGHTNESS: 0

## 2025-08-04 ENCOUNTER — LAB SERVICES (OUTPATIENT)
Dept: LAB | Age: 75
End: 2025-08-04

## 2025-08-04 DIAGNOSIS — M85.89 OSTEOPENIA OF MULTIPLE SITES: ICD-10-CM

## 2025-08-04 DIAGNOSIS — E78.5 HYPERLIPIDEMIA ASSOCIATED WITH TYPE 2 DIABETES MELLITUS  (CMD): ICD-10-CM

## 2025-08-04 DIAGNOSIS — E11.9 CONTROLLED TYPE 2 DIABETES MELLITUS WITHOUT COMPLICATION, WITHOUT LONG-TERM CURRENT USE OF INSULIN (CMD): ICD-10-CM

## 2025-08-04 DIAGNOSIS — R63.4 UNINTENTIONAL WEIGHT LOSS: ICD-10-CM

## 2025-08-04 DIAGNOSIS — E11.69 HYPERLIPIDEMIA ASSOCIATED WITH TYPE 2 DIABETES MELLITUS  (CMD): ICD-10-CM

## 2025-08-04 DIAGNOSIS — I10 BENIGN ESSENTIAL HYPERTENSION: ICD-10-CM

## 2025-08-04 LAB
25(OH)D3+25(OH)D2 SERPL-MCNC: 79.9 NG/ML (ref 30–100)
ALBUMIN SERPL-MCNC: 3.4 G/DL (ref 3.4–5)
ALBUMIN/GLOB SERPL: 1.2 {RATIO} (ref 1–2.4)
ALP SERPL-CCNC: 85 UNITS/L (ref 45–117)
ALT SERPL-CCNC: 21 UNITS/L
ANION GAP SERPL CALC-SCNC: 11 MMOL/L (ref 7–19)
AST SERPL-CCNC: 21 UNITS/L
BASOPHILS # BLD: 0.1 K/MCL (ref 0–0.3)
BASOPHILS NFR BLD: 1 %
BILIRUB SERPL-MCNC: 0.7 MG/DL (ref 0.2–1)
BUN SERPL-MCNC: 10 MG/DL (ref 6–20)
BUN/CREAT SERPL: 14 (ref 7–25)
CALCIUM SERPL-MCNC: 9 MG/DL (ref 8.4–10.2)
CHLORIDE SERPL-SCNC: 104 MMOL/L (ref 97–110)
CHOLEST SERPL-MCNC: 146 MG/DL
CHOLEST/HDLC SERPL: 2 {RATIO}
CO2 SERPL-SCNC: 26 MMOL/L (ref 21–32)
CREAT SERPL-MCNC: 0.7 MG/DL (ref 0.51–0.95)
CREAT UR-MCNC: 77 MG/DL
DEPRECATED RDW RBC: 41.9 FL (ref 39–50)
EGFRCR SERPLBLD CKD-EPI 2021: >90 ML/MIN/{1.73_M2}
EOSINOPHIL # BLD: 0.2 K/MCL (ref 0–0.5)
EOSINOPHIL NFR BLD: 2 %
ERYTHROCYTE [DISTWIDTH] IN BLOOD: 13.5 % (ref 11–15)
FASTING DURATION TIME PATIENT: 12 HOURS (ref 0–999)
GLOBULIN SER-MCNC: 2.8 G/DL (ref 2–4)
GLUCOSE SERPL-MCNC: 113 MG/DL (ref 70–99)
HBA1C MFR BLD: 6.2 % (ref 4.5–5.6)
HCT VFR BLD CALC: 39 % (ref 36–46.5)
HDLC SERPL-MCNC: 72 MG/DL
HGB BLD-MCNC: 12.6 G/DL (ref 12–15.5)
IMM GRANULOCYTES # BLD AUTO: 0 K/MCL (ref 0–0.2)
IMM GRANULOCYTES # BLD: 0 %
LDLC SERPL CALC-MCNC: 58 MG/DL
LYMPHOCYTES # BLD: 1.5 K/MCL (ref 1–4)
LYMPHOCYTES NFR BLD: 20 %
MCH RBC QN AUTO: 27.8 PG (ref 26–34)
MCHC RBC AUTO-ENTMCNC: 32.3 G/DL (ref 32–36.5)
MCV RBC AUTO: 85.9 FL (ref 78–100)
MICROALBUMIN UR-MCNC: 1.29 MG/DL
MICROALBUMIN/CREAT UR: 16.8 MG/G
MONOCYTES # BLD: 0.7 K/MCL (ref 0.3–0.9)
MONOCYTES NFR BLD: 9 %
NEUTROPHILS # BLD: 5.2 K/MCL (ref 1.8–7.7)
NEUTROPHILS NFR BLD: 68 %
NONHDLC SERPL-MCNC: 74 MG/DL
NRBC BLD MANUAL-RTO: 0 /100 WBC
PLATELET # BLD AUTO: 276 K/MCL (ref 140–450)
POTASSIUM SERPL-SCNC: 5.6 MMOL/L (ref 3.4–5.1)
PROT SERPL-MCNC: 6.2 G/DL (ref 6.4–8.2)
RBC # BLD: 4.54 MIL/MCL (ref 4–5.2)
SODIUM SERPL-SCNC: 135 MMOL/L (ref 135–145)
TRIGL SERPL-MCNC: 79 MG/DL
TSH SERPL-ACNC: 2.84 MCUNITS/ML (ref 0.35–5)
WBC # BLD: 7.7 K/MCL (ref 4.2–11)

## 2025-08-04 PROCEDURE — 36415 COLL VENOUS BLD VENIPUNCTURE: CPT | Performed by: INTERNAL MEDICINE

## 2025-08-06 ENCOUNTER — TELEPHONE (OUTPATIENT)
Dept: INTERNAL MEDICINE | Age: 75
End: 2025-08-06

## 2025-08-06 PROBLEM — E87.5 HYPERKALEMIA: Status: ACTIVE | Noted: 2025-08-06

## 2025-08-11 ENCOUNTER — LAB SERVICES (OUTPATIENT)
Dept: LAB | Age: 75
End: 2025-08-11

## 2025-08-11 DIAGNOSIS — E87.5 HYPERKALEMIA: ICD-10-CM

## 2025-08-11 LAB — POTASSIUM SERPL-SCNC: 5 MMOL/L (ref 3.4–5.1)

## 2025-08-11 PROCEDURE — 36415 COLL VENOUS BLD VENIPUNCTURE: CPT | Performed by: INTERNAL MEDICINE

## 2025-08-26 ENCOUNTER — HOSPITAL ENCOUNTER (OUTPATIENT)
Dept: CT IMAGING | Age: 75
Discharge: HOME | End: 2025-08-26
Attending: INTERNAL MEDICINE

## 2025-08-26 DIAGNOSIS — R63.4 UNINTENTIONAL WEIGHT LOSS: ICD-10-CM

## 2025-08-26 PROCEDURE — 71260 CT THORAX DX C+: CPT

## 2025-08-26 PROCEDURE — 74177 CT ABD & PELVIS W/CONTRAST: CPT

## 2025-08-26 PROCEDURE — 10002805 HB CONTRAST AGENT: Performed by: INTERNAL MEDICINE

## 2025-08-26 RX ADMIN — IOHEXOL 85 ML: 350 INJECTION, SOLUTION INTRAVENOUS at 11:49

## 2025-08-28 PROBLEM — E11.69 HYPERLIPIDEMIA ASSOCIATED WITH TYPE 2 DIABETES MELLITUS (HCC): Status: ACTIVE | Noted: 2025-08-28

## 2025-08-28 PROBLEM — E78.5 HYPERLIPIDEMIA ASSOCIATED WITH TYPE 2 DIABETES MELLITUS (HCC): Status: ACTIVE | Noted: 2025-08-28

## 2025-08-28 PROBLEM — E11.59 HYPERTENSION ASSOCIATED WITH DIABETES (HCC): Status: ACTIVE | Noted: 2025-08-28

## 2025-08-28 PROBLEM — I15.2 HYPERTENSION ASSOCIATED WITH DIABETES (HCC): Status: ACTIVE | Noted: 2025-08-28

## 2025-08-28 PROBLEM — E08.9: Chronic | Status: RESOLVED | Noted: 2024-02-19 | Resolved: 2025-08-28

## 2025-08-28 PROBLEM — M85.88 OSTEOPENIA OF LUMBAR SPINE: Status: ACTIVE | Noted: 2025-08-28

## 2025-08-28 PROBLEM — M85.88 OSTEOPENIA OF LUMBAR SPINE: Chronic | Status: RESOLVED | Noted: 2021-11-23 | Resolved: 2025-08-28

## 2025-08-29 ENCOUNTER — PATIENT MESSAGE (OUTPATIENT)
Facility: CLINIC | Age: 75
End: 2025-08-29

## 2025-11-18 ENCOUNTER — APPOINTMENT (OUTPATIENT)
Dept: INTERNAL MEDICINE | Age: 75
End: 2025-11-18

## 2025-11-19 ENCOUNTER — APPOINTMENT (OUTPATIENT)
Dept: INTERNAL MEDICINE | Age: 75
End: 2025-11-19

## (undated) DIAGNOSIS — Z79.4 TYPE 2 DIABETES MELLITUS WITHOUT COMPLICATION, WITH LONG-TERM CURRENT USE OF INSULIN (HCC): ICD-10-CM

## (undated) DIAGNOSIS — E11.9 TYPE 2 DIABETES MELLITUS WITHOUT COMPLICATION, WITH LONG-TERM CURRENT USE OF INSULIN (HCC): ICD-10-CM

## (undated) NOTE — LETTER
12/23/21        [place] [address]      Dear  Hence records indicate that you have outstanding lab work and or testing that was ordered for you and has not yet been completed:  Orders Placed This Encounter      C

## (undated) NOTE — LETTER
05/22/19        James Louie  [address]      Dear Hossein Kemp records indicate that you have lab work and or testing that was ordered for you and has not yet been completed:  Orders Placed This Encounter      Vitamin D, 25